# Patient Record
Sex: FEMALE | Race: WHITE | NOT HISPANIC OR LATINO | Employment: FULL TIME | URBAN - METROPOLITAN AREA
[De-identification: names, ages, dates, MRNs, and addresses within clinical notes are randomized per-mention and may not be internally consistent; named-entity substitution may affect disease eponyms.]

---

## 2017-01-10 ENCOUNTER — RADIANT APPOINTMENT (OUTPATIENT)
Dept: GENERAL RADIOLOGY | Facility: CLINIC | Age: 47
End: 2017-01-10
Attending: PODIATRIST
Payer: COMMERCIAL

## 2017-01-10 ENCOUNTER — OFFICE VISIT (OUTPATIENT)
Dept: PODIATRY | Facility: CLINIC | Age: 47
End: 2017-01-10
Payer: COMMERCIAL

## 2017-01-10 VITALS
WEIGHT: 188 LBS | BODY MASS INDEX: 33.31 KG/M2 | HEIGHT: 63 IN | DIASTOLIC BLOOD PRESSURE: 62 MMHG | SYSTOLIC BLOOD PRESSURE: 106 MMHG

## 2017-01-10 DIAGNOSIS — M25.572 SINUS TARSI SYNDROME OF LEFT FOOT: ICD-10-CM

## 2017-01-10 DIAGNOSIS — Q66.229 CONGENITAL METATARSUS ADDUCTUS: ICD-10-CM

## 2017-01-10 DIAGNOSIS — M79.672 LEFT FOOT PAIN: ICD-10-CM

## 2017-01-10 DIAGNOSIS — M19.072 PRIMARY LOCALIZED OSTEOARTHROSIS, ANKLE AND FOOT, LEFT: ICD-10-CM

## 2017-01-10 DIAGNOSIS — M76.72 PERONEAL TENDONITIS OF LEFT LOWER EXTREMITY: ICD-10-CM

## 2017-01-10 DIAGNOSIS — M79.672 LEFT FOOT PAIN: Primary | ICD-10-CM

## 2017-01-10 PROCEDURE — 73630 X-RAY EXAM OF FOOT: CPT | Mod: LT

## 2017-01-10 PROCEDURE — 99203 OFFICE O/P NEW LOW 30 MIN: CPT | Performed by: PODIATRIST

## 2017-01-10 RX ORDER — IBUPROFEN 600 MG/1
600 TABLET, FILM COATED ORAL
COMMUNITY
Start: 2014-09-18 | End: 2022-09-07

## 2017-01-10 RX ORDER — DEXAMETHASONE SODIUM PHOSPHATE 4 MG/ML
4 INJECTION, SOLUTION INTRA-ARTICULAR; INTRALESIONAL; INTRAMUSCULAR; INTRAVENOUS; SOFT TISSUE ONCE
Qty: 30 ML | Refills: 0 | Status: SHIPPED | OUTPATIENT
Start: 2017-01-10 | End: 2022-09-07

## 2017-01-10 NOTE — PATIENT INSTRUCTIONS
Call in 1 month if not better to order MRI.            [facility] Schedule     [facility]  [address] [address]  Ph: [phone]  Fax: [phone] [facility]  [address]   [place] [address]  Ph: [phone]  Fax: [phone] [facility]  [address] [place] [address]  Ph: [phone]  Fax: [phone]   Monday PM &  PM   Surgery Scheduling Line:  [phone] [facility]  [address]  [place] [address]  Ph: [phone] [facility]  [address]  [place] [address]  Ph: [phone]  Fax: [phone]   Appointment [phone] General After Hours:  [phone] Patient [phone]         Osteoarthritis of the Foot and Ankle  What Is Osteoarthritis?  Osteoarthritis is a condition characterized by the breakdown and eventual loss of cartilage in one or more joints. Cartilage (the connective tissue found at the end of the bones in the joints) protects and cushions the bones during movement. When cartilage deteriorates or is lost, symptoms develop that can restrict one s ability to easily perform daily activities.  Osteoarthritis is also known as degenerative arthritis, reflecting its nature to develop as part of the aging process. As the most common form of arthritis, osteoarthritis affects millions of Americans. Some people refer to osteoarthritis simply as arthritis, even though there are many different types of arthritis.  Osteoarthritis appears at various joints throughout the body, including the hands, feet, spine, hips, and knees. In the foot, the disease most frequently occurs in the big toe, although it is also often found in the midfoot and ankle.  Causes  Osteoarthritis is considered a  wear and tear  disease because the cartilage in the joint wears down with repeated stress and  use over time. As the cartilage deteriorates and gets thinner, the bones lose their protective covering and eventually may rub together, causing pain and inflammation of the joint.  An injury may also lead to osteoarthritis, although it may take months or years after the injury for the condition to develop. For example, osteoarthritis in the big toe is often caused by kicking or jamming the toe, or by dropping something on the toe. Osteoarthritis in the midfoot is often caused by dropping something on it, or by a sprain or fracture. In the ankle, osteoarthritis is usually caused by a fracture and occasionally by a severe sprain.  Sometimes osteoarthritis develops as a result of abnormal foot mechanics such as flat feet or high arches. A flat foot causes less stability in the ligaments (bands of tissue that connect bones), resulting in excessive strain on the joints, which can cause arthritis. A high arch is rigid and lacks mobility, causing a jamming of joints that creates an increased risk of arthritis.  Symptoms  People with osteoarthritis in the foot or ankle experience, in varying degrees, one or more of the following: Pain and stiffness in the joint, swelling in or near the joint, or difficulty walking or bending the joint.   Some patients with osteoarthritis also develop a bone spur (a bony protrusion) at the affected joint. Shoe pressure may cause pain at the site of a bone spur, and in some cases blisters or calluses may form over its surface. Bone spurs can also limit the movement of the joint.    Diagnosis  In diagnosing osteoarthritis, the foot and ankle surgeon will examine the foot thoroughly, looking for swelling in the joint, limited mobility, and pain with movement. In some cases, deformity and/or enlargement (spur) of the joint may be noted. X-rays may be ordered to evaluate the extent of the disease.  Non-surgical Treatment  To help relieve symptoms, the surgeon may begin treating osteoarthritis  with one or more of the following non-surgical approaches:  Oral medications. Nonsteroidal anti-inflammatory drugs (NSAIDs), such as ibuprofen, are often helpful in reducing the inflammation and pain. Occasionally a prescription for a steroid medication is needed to adequately reduce symptoms.   Orthotic devices. Custom orthotic devices (shoe inserts) are often prescribed to provide support to improve the foot s mechanics or cushioning to help minimize pain.   Bracing. Bracing, which restricts motion and supports the joint, can reduce pain during walking and help prevent further deformity.   Immobilization. Protecting the foot from movement by wearing a cast or removable cast-boot may be necessary to allow the inflammation to resolve.   Steroid injections. In some cases, steroid injections are applied to the affected joint to deliver anti-inflammatory medication.   Physical therapy. Exercises to strengthen the muscles, especially when the osteoarthritis occurs in the ankle, may give the patient greater stability and help avoid injury that might worsen the condition.   When Is Surgery Needed?  When osteoarthritis has progressed substantially or failed to improve with non-surgical treatment, surgery may be recommended. In advanced cases, surgery may be the only option. The goal of surgery is to decrease pain and improve function. The foot and ankle surgeon will consider a number of factors when selecting the procedure best suited to the patient s condition and lifestyle.    What is the Sinus Tarsi Syndrome?  Definition:  Clinical disorder characterized by specific symptoms and signs localized to the sinus tarsi (known as the  eye of the foot ), which refers to an opening on the outside of the foot between the ankle and heel bone.   History:  First described by Tahir Solorio in 1957. He also described a surgical procedure to address this problem (called the DEMAR Solorio procedure) that involves removal of all or a portion  of the contents of the sinus tarsi.  Etiology:  Cause can be due to an inversion (rolling out) ankle sprain (70-80% of the time) or can be due to a  pinching  or impingement of the soft tissues in the sinus tarsi due to a very pronated (rolling in) foot (20-30% of the time).  Clinical Presentation:  Patients present with localize pain to the sinus tarsi region with a feeling of instability and aggravation by weight bearing activity. These patients do poorly on uneven surfaces, i.e., grass and gravel. Physical examination reveals pain to palpation of the sinus tarsi with aggravation on foot inversion (turning in) or eversion (turning out). Looseness and instability of the ankle and foot joints may be present as well.  Diagnostic Testing:   May include x-rays, bone scan, CT scan and MRI evaluation. Injection with local anesthetic is diagnostic for localizing this problem to the sinus tarsi. Many times this is a diagnosis make by excluding other common problems in the foot as definitive diagnostic findings are rarely present. MRI is probably the one best test to shoe changes in the tissues of the sinus tarsi involving either inflammation or scar tissue from previous injury. Ankle arthroscopy may also be beneficial to directly evaluate the sinus for damaged tissue.  Treatment:  After a diagnosis is established conservative treatment can be initiated which is generally very effective in eliminating the problem. Treatment may include anti-inflammatories, stable shoes, period of immobilization, ankle sleeve and over-the-counter orthoses. Resistant cases may require a course of oral steroids, series of steroid injectionss, physical therapy or custom orthoses. Rarely is surgery indicated and if needed open surgery (through an incision) or closed surgery (via arthroscopy) can be considered. Excellent results should be expected but surgery is not a panacea and should be considered as a last resort.  Summary:  STS is a problem  that can occur commonly after an ankle sprain or in someone who has a severly pronated foot. Diagnosis is critical as this will dictate appropriate treatment which can differ significantly from other common problems seen in the foot and ankle. Conservative treatment is usually effective and surgery is rarely needed and should be considered after an adequate and thorough trial of conservative treatment.    EXERCISES   RANGE OF MOTION (ROM) AND STRETCHING EXERCISES - Subtalar Dislocation, Phase II   After your physician, physical therapist or  feels your ankle has made progress significant enough to begin more advanced exercises, he or she may recommend some of the exercises that follow. He or she may also advise you to continue with the exercises which you completed in Phase I of your rehabilitation. While completing these exercises, remember:   Restoring tissue flexibility helps normal motion to return to the joints. This allows healthier, less painful movement and activity.   An effective stretch should be held for at least 30 seconds.   A stretch should never be painful. You should only feel a gentle lengthening or release in the stretched tissue.       RANGE OF MOTION- Ankle Plantar Flexion   Sit with your right / left leg crossed over your opposite knee.   Use your opposite hand to pull the top of your foot and toes toward you.   You should feel a gentle stretch on the top of your foot/ankle. Hold this position for 10 seconds. Repeat 10-12 times    RANGE OF MOTION - Ankle Eversion   Sit with your right / left ankle crossed over your opposite knee.    your foot with your opposite hand, placing your thumb on the top of your foot and your fingers across the bottom of your foot.   Gently push your foot downward with a slight rotation so your littlest toes rise slightly.   You should feel a gentle stretch on the inside of your ankle. Hold the stretch for 10 seconds.   Repeat 10-12 times    RANGE  OF MOTION - Ankle Inversion   Sit with your right / left ankle crossed over your opposite knee.    your foot with your opposite hand, placing your thumb on the bottom of your foot and your fingers across the top of your foot.   Gently pull your foot so the smallest toe comes toward you and your thumb pushes the inside of the ball of your foot away from you.   You should feel a gentle stretch on the outside of your ankle. Hold the stretch for 10 seconds.   Repeat 10-12 times      RANGE OF MOTION - Ankle Dorsiflexion, Active Assisted   Remove shoes and sit on a chair that is preferably not on a carpeted surface.   Place right / left foot under knee. Extend your opposite leg for support.   Keeping your heel down, slide your right / left foot back toward the chair until you feel a stretch at your ankle or calf. If you do not feel a stretch, slide your bottom forward to the edge of the chair, while still keeping your heel down.   Hold this stretch for 10 seconds.   Repeat 10-12 times.       STRETCH - Gastrocsoleus, Standing   Note: This exercise can place a lot of stress on your foot and ankle. Please complete this exercise only if specifically instructed by your caregiver.   Place the ball of your right / left foot on a step, keeping your other foot firmly on the same step.   Hold on to the wall or a rail for balance.   Slowly lift your other foot, allowing your body weight to press your heel down over the edge of the step.   You should feel a stretch in your right / left calf.   Hold this position for 10 seconds.   Repeat this exercise with a slight bend in your right / left knee.   Repeat 10-12 times   STRENGTHENING EXERCISES - Subtalar Dislocation Phase II   These are some of the exercises you may progress to in your rehabilitation program. Do not begin these until you have your clinician's permission. Although your condition has improved, the Phase I exercises will continue to be helpful and you may continue to  complete them. As you complete strengthening exercises, remember:   Strong muscles with good endurance tolerate stress better.   Do the exercises as initially prescribed by your caregiver. Progress slowly with each exercise, gradually increasing the number of repetitions and weight used under his or her guidance.   You may experience muscle soreness or fatigue, but the pain or discomfort you are trying to eliminate should never worsen during these exercises. If this pain does worsen, stop and make certain you are following the directions exactly. If the pain is still present after adjustments, discontinue the exercise until you can discuss the trouble with your clinician.   You may experience muscle soreness or fatigue, but the pain or discomfort you are trying to eliminate should never worsen during these exercises. If this pain does worsen, stop and make certain you are following the directions exactly. If the pain is still present after adjustments, discontinue the exercise until you can discuss the trouble with your clinician.     STRENGTH - Plantar-flexors, Standing   Stand with your feet, shoulder width apart. Steady yourself with a wall or table using as little support as needed.   Keeping your weight evenly spread over the width of your feet, rise up on your toes.*   Hold this position for 10 seconds.   Repeat 10-12 times  *If this is too easy, shift your weight toward your right / left leg until you feel challenged. Ultimately, you may be asked to do this exercise with you right / left foot only.     STRENGTH - Plantar-flexors, Eccentric   Note: This exercise can place a lot of stress on your foot and ankle. Please complete this exercise only if specifically instructed by your caregiver.   Place the balls of your feet on a step. With your hands, use only enough support from a wall or rail to keep your balance.   Keep your knees straight and rise up on your toes.   Slowly shift your weight entirely to your  right / left toes and  your opposite foot. Gently and with controlled movement, lower your weight through your right / left foot so that your heel drops below the level of the step. You will feel a slight stretch in the back of your right / left calf at the ending position.   Use the healthy leg to help rise up onto the balls of both feet, then lower weight only on the right / left leg again. Build up to 15 repetitions. Then progress to 3 consecutive sets of 15 repetitions.*   After completing the above exercise, complete the same exercise with a slight knee bend (about 30 degrees). Again, build up to 15 repetitions. Then progress to 3 consecutive sets of 15 repetitions.*   Perform this exercise 3 times per day.   *When you easily complete 3 sets of 15, your physician, physical therapist or  may advise you to add resistance by wearing a backpack filled with additional weight.  TENDONITIS   Tendons are the strong fibrous portions ofmuscles that attach to bones and allow the muscle to move a joint when it contracts. Tendons are very strong because they have a lot of force exerted on them. Sometimes tendons can become painful because they have suffered an acute injury, in which too much force was exerted at one time, or an overuse injury, in which a normal force was exerted too frequently or over a prolonged period of time. As a result, there is damage to the tendon and its surrounding soft tissue structures and they become inflammed. Because tendons do not have a great blood supply, they do not heal rapidly and the inflammation can become chronic.   Conservative treatment for tendinitis involves rest and anti-inflammatory measures. Ice is applied 15 minutes 2-3 times daily. Anti-inflammatory medications called NSAIDs (ibuprofen, example) can be taken provided they are used with caution, as they can lead to internal bleeding and increase the risk ofstroke and heart attack. Sometimes topical  nitroglycerin is prescribed to help with pain. Often your doctor will use a special shoe or removable walking cast to immobilize the tendon, allowing it to heal without further damage from use. These devices are very useful in helping tendons heal, but they may slow you down or make you feel like your hip, knee, or back are out ofalignment. This is temporary and should go away once you are out ofthe immobilization. You should not use a walking cast when showering or driving. Another option is Platelet Rich Plasma injections. (Normally done with a Sports and Orthorapedic doctor.   If conservative measures fail, your physician may need to surgically repair the tendon by removing any chronic inflammatory tissue and sewing it back together. Sometimes it is sewn to an adjacent tendon with similar function for support and sometimes it is lengthened. . Sometimes the bones around the tendon need to be realigned or reshaped to better support the tendon or prevent further damage. Your foot and ankle surgeon will discuss the specifics of your surgery with you, should you need it.    Towel stretch: Sit on a hard surface with your injured leg stretched out in front of you. Loop a towel around your toes and the ball of your foot and pull the towel toward your body keeping your leg straight. Hold this position for 15 to 30 seconds and then relax. Repeat 3 times. Then push the towel away with the ball of your foot. Repeat 3 times.  When you don't feel much of a stretch using the towel, you can start the standing calf stretch and the following exercises.  Standing calf stretch: Stand facing a wall with your hands on the wall at about eye level. Keep your injured leg back with your heel on the floor. Keep the other leg forward with the knee bent. Turn your back foot slightly inward (as if you were pigeon-toed). Slowly lean into the wall until you feel a stretch in the back of your calf. Hold the stretch for 15 to 30 seconds. Return  to the starting position. Repeat 3 times. Do this exercise several times each day.   Standing soleus stretch: Stand facing a wall with your hands on the wall at about chest height. Keep your injured leg back with your heel on the floor. Keep the other leg forward with the knee bent. Turn your back foot slightly inward (as if you were pigeon-toed). Bend your back knee slightly and gently lean into the wall until you feel a stretch in the lower calf of your injured leg. Hold the stretch for 15 to 30 seconds. Return to the starting position. Repeat 3 times.   Achilles stretch: Stand with the ball of one foot on a stair. Reach for the step below with your heel until you feel a stretch in the arch of your foot. Hold this position for 15 to 30 seconds and then relax. Repeat 3 times.   Heel raise: Balance yourself while standing behind a chair or counter. Using the chair or counter as a support to help you, raise your body up onto your toes and hold for 5 seconds. Then slowly lower yourself down without holding onto the support. (It's OK to keep holding onto the support if you need to.) When this exercise becomes less painful, try lowering yourself down on the injured leg only. Repeat 15 times. Do 2 sets of 15. Rest 30 seconds between sets.   Step-up: Stand with the foot of your injured leg on a support 3 to 5 inches high (like a small step or block of wood). Keep your other foot flat on the floor. Shift your weight onto the injured leg on the support. Straighten your injured leg as the other leg comes off the floor. Return to the starting position by bending your injured leg and slowly lowering your uninjured leg back to the floor. Do 2 sets of 15.   Resisted ankle eversion: Sit with both legs stretched out in front of you, with your feet about a shoulder's width apart. Tie a loop in one end of elastic tubing. Put the foot of your injured leg through the loop so that the tubing goes around the arch of that foot and wraps  around the outside of the other foot. Hold onto the other end of the tubing with your hand to provide tension. Turn the foot of your injured leg up and out. Make sure you keep your other foot still so that it will allow the tubing to stretch as you move the foot of your injured leg. Return to the starting position. Do 2 sets of 15.   Balance and reach exercises: Stand next to a chair with your injured leg farther from the chair. The chair will provide support if you need it. Stand on the foot of your injured leg and bend your knee slightly. Try to raise the arch of this foot while keeping your big toe on the floor.   Keep your foot in this position. With the hand that is farther away from the chair, reach forward in front of you by bending at the waist. Avoid bending your knee any more as you do this. Repeat this 10 times. To make the exercise more challenging, reach farther in front of you. Do 2 sets of 10.    the same position as above. While keeping your arch height, reach the hand that is farther away from the chair across your body toward the chair. The farther you reach, the more challenging the exercise. Do 2 sets of 10.    Resisted ankle eversion: Sit with both legs stretched out in front of you, with your feet about a shoulder's width apart. Tie a loop in one end of elastic tubing. Put the foot of your injured leg through the loop so that the tubing goes around the arch of that foot and wraps around the outside of the other foot. Hold onto the other end of the tubing with your hand to provide tension. Turn the foot of your injured leg up and out. Make sure you keep your other foot still so that it will allow the tubing to stretch as you move the foot of your injured leg. Return to the starting position. Do 2 sets of 15.   If you have access to a wobble board, do the following exercises:  Wobble board exercises:   Stand on a wobble board with your feet shoulder width apart. Rock the board forwards  and backwards 30 times, then side to side 30 times. Hold on to a chair if you need support.   Rotate the wobble board around so that the edge of the board is in contact with the floor at all times. Do this 30 times in a clockwise and then a counterclockwise direction.   Balance on the wobble board for as long as you can without letting the edges touch the floor. Try to do this for 2 minutes without touching the floor.   Rotate the wobble board in clockwise and counterclockwise circles, but do not let the edge of the board touch the floor.   When you have mastered exercises A through D, try repeating them while standing on just your injured leg.   After you are able to do these exercises on one leg, try to do them with your eyes closed. Make sure you have something nearby to support you in case you lose your balance.

## 2017-01-10 NOTE — NURSING NOTE
"Chief Complaint   Patient presents with     Foot Problems     pain on left foot x3.5months feels pain in the midle of her foot when squeezed in the middle of the foot        Initial /62 mmHg  Ht 5' 3\" (1.6 m)  Wt 188 lb (85.276 kg)  BMI 33.31 kg/m2 Estimated body mass index is 33.31 kg/(m^2) as calculated from the following:    Height as of this encounter: 5' 3\" (1.6 m).    Weight as of this encounter: 188 lb (85.276 kg).  BP completed using cuff size: regular  Raphael Ho MA      "

## 2017-01-10 NOTE — MR AVS SNAPSHOT
After Visit Summary   1/10/2017    Fadia Geronimo    MRN: [id number]           Patient Information     Date Of Birth          1970        Visit Information        Provider Department      1/10/2017 3:45 PM Raissa Hernandez DPM, [facility]        Today's Diagnoses     Left foot pain    -  1     Primary localized osteoarthrosis, ankle and foot, left         Sinus tarsi syndrome of left foot         Peroneal tendonitis of left lower extremity         Congenital metatarsus adductus           Care Instructions    Call in 1 month if not better to order MRI.            Dr. Hernandez's Clinic Schedule     [facility]  [address] [place] [address]  Ph: [phone]  Fax: [phone] [facility]  [address] [place] [address]  Ph: [phone]  Fax: [phone] [facility]  [address] [place] [address]  Ph: [phone]  Fax: [phone]   Monday PM &  AM Friday PM   Surgery Scheduling Line:  [phone] [facility]  [address]  [place] [address]  Ph: [phone] [facility]  [address]  [place] [address]  Ph: [phone]  Fax: [phone]   Appointment [phone] General After Hours:  [phone] Patient [phone]         Osteoarthritis of the Foot and Ankle  What Is Osteoarthritis?  Osteoarthritis is a condition characterized by the breakdown and eventual loss of cartilage in one or more joints. Cartilage (the connective tissue found at the end of the bones in the joints) protects and cushions the bones during movement. When cartilage deteriorates or is lost, symptoms develop that can restrict one s ability to easily perform daily activities.  Osteoarthritis is also known as degenerative arthritis, reflecting its nature to develop as part of the aging process. As the most  common form of arthritis, osteoarthritis affects millions of Americans. Some people refer to osteoarthritis simply as arthritis, even though there are many different types of arthritis.  Osteoarthritis appears at various joints throughout the body, including the hands, feet, spine, hips, and knees. In the foot, the disease most frequently occurs in the big toe, although it is also often found in the midfoot and ankle.  Causes  Osteoarthritis is considered a  wear and tear  disease because the cartilage in the joint wears down with repeated stress and use over time. As the cartilage deteriorates and gets thinner, the bones lose their protective covering and eventually may rub together, causing pain and inflammation of the joint.  An injury may also lead to osteoarthritis, although it may take months or years after the injury for the condition to develop. For example, osteoarthritis in the big toe is often caused by kicking or jamming the toe, or by dropping something on the toe. Osteoarthritis in the midfoot is often caused by dropping something on it, or by a sprain or fracture. In the ankle, osteoarthritis is usually caused by a fracture and occasionally by a severe sprain.  Sometimes osteoarthritis develops as a result of abnormal foot mechanics such as flat feet or high arches. A flat foot causes less stability in the ligaments (bands of tissue that connect bones), resulting in excessive strain on the joints, which can cause arthritis. A high arch is rigid and lacks mobility, causing a jamming of joints that creates an increased risk of arthritis.  Symptoms  People with osteoarthritis in the foot or ankle experience, in varying degrees, one or more of the following: Pain and stiffness in the joint, swelling in or near the joint, or difficulty walking or bending the joint.   Some patients with osteoarthritis also develop a bone spur (a bony protrusion) at the affected joint. Shoe pressure may cause pain at the  site of a bone spur, and in some cases blisters or calluses may form over its surface. Bone spurs can also limit the movement of the joint.    Diagnosis  In diagnosing osteoarthritis, the foot and ankle surgeon will examine the foot thoroughly, looking for swelling in the joint, limited mobility, and pain with movement. In some cases, deformity and/or enlargement (spur) of the joint may be noted. X-rays may be ordered to evaluate the extent of the disease.  Non-surgical Treatment  To help relieve symptoms, the surgeon may begin treating osteoarthritis with one or more of the following non-surgical approaches:  Oral medications. Nonsteroidal anti-inflammatory drugs (NSAIDs), such as ibuprofen, are often helpful in reducing the inflammation and pain. Occasionally a prescription for a steroid medication is needed to adequately reduce symptoms.   Orthotic devices. Custom orthotic devices (shoe inserts) are often prescribed to provide support to improve the foot s mechanics or cushioning to help minimize pain.   Bracing. Bracing, which restricts motion and supports the joint, can reduce pain during walking and help prevent further deformity.   Immobilization. Protecting the foot from movement by wearing a cast or removable cast-boot may be necessary to allow the inflammation to resolve.   Steroid injections. In some cases, steroid injections are applied to the affected joint to deliver anti-inflammatory medication.   Physical therapy. Exercises to strengthen the muscles, especially when the osteoarthritis occurs in the ankle, may give the patient greater stability and help avoid injury that might worsen the condition.   When Is Surgery Needed?  When osteoarthritis has progressed substantially or failed to improve with non-surgical treatment, surgery may be recommended. In advanced cases, surgery may be the only option. The goal of surgery is to decrease pain and improve function. The foot and ankle surgeon will consider  a number of factors when selecting the procedure best suited to the patient s condition and lifestyle.    What is the Sinus Tarsi Syndrome?  Definition:  Clinical disorder characterized by specific symptoms and signs localized to the sinus tarsi (known as the  eye of the foot ), which refers to an opening on the outside of the foot between the ankle and heel bone.   History:  First described by Tahir Solorio in 1957. He also described a surgical procedure to address this problem (called the DEMAR Solorio procedure) that involves removal of all or a portion of the contents of the sinus tarsi.  Etiology:  Cause can be due to an inversion (rolling out) ankle sprain (70-80% of the time) or can be due to a  pinching  or impingement of the soft tissues in the sinus tarsi due to a very pronated (rolling in) foot (20-30% of the time).  Clinical Presentation:  Patients present with localize pain to the sinus tarsi region with a feeling of instability and aggravation by weight bearing activity. These patients do poorly on uneven surfaces, i.e., grass and gravel. Physical examination reveals pain to palpation of the sinus tarsi with aggravation on foot inversion (turning in) or eversion (turning out). Looseness and instability of the ankle and foot joints may be present as well.  Diagnostic Testing:   May include x-rays, bone scan, CT scan and MRI evaluation. Injection with local anesthetic is diagnostic for localizing this problem to the sinus tarsi. Many times this is a diagnosis make by excluding other common problems in the foot as definitive diagnostic findings are rarely present. MRI is probably the one best test to shoe changes in the tissues of the sinus tarsi involving either inflammation or scar tissue from previous injury. Ankle arthroscopy may also be beneficial to directly evaluate the sinus for damaged tissue.  Treatment:  After a diagnosis is established conservative treatment can be initiated which is generally  very effective in eliminating the problem. Treatment may include anti-inflammatories, stable shoes, period of immobilization, ankle sleeve and over-the-counter orthoses. Resistant cases may require a course of oral steroids, series of steroid injectionss, physical therapy or custom orthoses. Rarely is surgery indicated and if needed open surgery (through an incision) or closed surgery (via arthroscopy) can be considered. Excellent results should be expected but surgery is not a panacea and should be considered as a last resort.  Summary:  STS is a problem that can occur commonly after an ankle sprain or in someone who has a severly pronated foot. Diagnosis is critical as this will dictate appropriate treatment which can differ significantly from other common problems seen in the foot and ankle. Conservative treatment is usually effective and surgery is rarely needed and should be considered after an adequate and thorough trial of conservative treatment.    EXERCISES   RANGE OF MOTION (ROM) AND STRETCHING EXERCISES - Subtalar Dislocation, Phase II   After your physician, physical therapist or  feels your ankle has made progress significant enough to begin more advanced exercises, he or she may recommend some of the exercises that follow. He or she may also advise you to continue with the exercises which you completed in Phase I of your rehabilitation. While completing these exercises, remember:   Restoring tissue flexibility helps normal motion to return to the joints. This allows healthier, less painful movement and activity.   An effective stretch should be held for at least 30 seconds.   A stretch should never be painful. You should only feel a gentle lengthening or release in the stretched tissue.       RANGE OF MOTION- Ankle Plantar Flexion   Sit with your right / left leg crossed over your opposite knee.   Use your opposite hand to pull the top of your foot and toes toward you.   You should feel  a gentle stretch on the top of your foot/ankle. Hold this position for 10 seconds. Repeat 10-12 times    RANGE OF MOTION - Ankle Eversion   Sit with your right / left ankle crossed over your opposite knee.    your foot with your opposite hand, placing your thumb on the top of your foot and your fingers across the bottom of your foot.   Gently push your foot downward with a slight rotation so your littlest toes rise slightly.   You should feel a gentle stretch on the inside of your ankle. Hold the stretch for 10 seconds.   Repeat 10-12 times    RANGE OF MOTION - Ankle Inversion   Sit with your right / left ankle crossed over your opposite knee.    your foot with your opposite hand, placing your thumb on the bottom of your foot and your fingers across the top of your foot.   Gently pull your foot so the smallest toe comes toward you and your thumb pushes the inside of the ball of your foot away from you.   You should feel a gentle stretch on the outside of your ankle. Hold the stretch for 10 seconds.   Repeat 10-12 times      RANGE OF MOTION - Ankle Dorsiflexion, Active Assisted   Remove shoes and sit on a chair that is preferably not on a carpeted surface.   Place right / left foot under knee. Extend your opposite leg for support.   Keeping your heel down, slide your right / left foot back toward the chair until you feel a stretch at your ankle or calf. If you do not feel a stretch, slide your bottom forward to the edge of the chair, while still keeping your heel down.   Hold this stretch for 10 seconds.   Repeat 10-12 times.       STRETCH - Gastrocsoleus, Standing   Note: This exercise can place a lot of stress on your foot and ankle. Please complete this exercise only if specifically instructed by your caregiver.   Place the ball of your right / left foot on a step, keeping your other foot firmly on the same step.   Hold on to the wall or a rail for balance.   Slowly lift your other foot, allowing your body  weight to press your heel down over the edge of the step.   You should feel a stretch in your right / left calf.   Hold this position for 10 seconds.   Repeat this exercise with a slight bend in your right / left knee.   Repeat 10-12 times   STRENGTHENING EXERCISES - Subtalar Dislocation Phase II   These are some of the exercises you may progress to in your rehabilitation program. Do not begin these until you have your clinician's permission. Although your condition has improved, the Phase I exercises will continue to be helpful and you may continue to complete them. As you complete strengthening exercises, remember:   Strong muscles with good endurance tolerate stress better.   Do the exercises as initially prescribed by your caregiver. Progress slowly with each exercise, gradually increasing the number of repetitions and weight used under his or her guidance.   You may experience muscle soreness or fatigue, but the pain or discomfort you are trying to eliminate should never worsen during these exercises. If this pain does worsen, stop and make certain you are following the directions exactly. If the pain is still present after adjustments, discontinue the exercise until you can discuss the trouble with your clinician.   You may experience muscle soreness or fatigue, but the pain or discomfort you are trying to eliminate should never worsen during these exercises. If this pain does worsen, stop and make certain you are following the directions exactly. If the pain is still present after adjustments, discontinue the exercise until you can discuss the trouble with your clinician.     STRENGTH - Plantar-flexors, Standing   Stand with your feet, shoulder width apart. Steady yourself with a wall or table using as little support as needed.   Keeping your weight evenly spread over the width of your feet, rise up on your toes.*   Hold this position for 10 seconds.   Repeat 10-12 times  *If this is too easy, shift your  weight toward your right / left leg until you feel challenged. Ultimately, you may be asked to do this exercise with you right / left foot only.     STRENGTH - Plantar-flexors, Eccentric   Note: This exercise can place a lot of stress on your foot and ankle. Please complete this exercise only if specifically instructed by your caregiver.   Place the balls of your feet on a step. With your hands, use only enough support from a wall or rail to keep your balance.   Keep your knees straight and rise up on your toes.   Slowly shift your weight entirely to your right / left toes and  your opposite foot. Gently and with controlled movement, lower your weight through your right / left foot so that your heel drops below the level of the step. You will feel a slight stretch in the back of your right / left calf at the ending position.   Use the healthy leg to help rise up onto the balls of both feet, then lower weight only on the right / left leg again. Build up to 15 repetitions. Then progress to 3 consecutive sets of 15 repetitions.*   After completing the above exercise, complete the same exercise with a slight knee bend (about 30 degrees). Again, build up to 15 repetitions. Then progress to 3 consecutive sets of 15 repetitions.*   Perform this exercise 3 times per day.   *When you easily complete 3 sets of 15, your physician, physical therapist or  may advise you to add resistance by wearing a backpack filled with additional weight.  TENDONITIS   Tendons are the strong fibrous portions ofmuscles that attach to bones and allow the muscle to move a joint when it contracts. Tendons are very strong because they have a lot of force exerted on them. Sometimes tendons can become painful because they have suffered an acute injury, in which too much force was exerted at one time, or an overuse injury, in which a normal force was exerted too frequently or over a prolonged period of time. As a result, there is  damage to the tendon and its surrounding soft tissue structures and they become inflammed. Because tendons do not have a great blood supply, they do not heal rapidly and the inflammation can become chronic.   Conservative treatment for tendinitis involves rest and anti-inflammatory measures. Ice is applied 15 minutes 2-3 times daily. Anti-inflammatory medications called NSAIDs (ibuprofen, example) can be taken provided they are used with caution, as they can lead to internal bleeding and increase the risk ofstroke and heart attack. Sometimes topical nitroglycerin is prescribed to help with pain. Often your doctor will use a special shoe or removable walking cast to immobilize the tendon, allowing it to heal without further damage from use. These devices are very useful in helping tendons heal, but they may slow you down or make you feel like your hip, knee, or back are out ofalignment. This is temporary and should go away once you are out ofthe immobilization. You should not use a walking cast when showering or driving. Another option is Platelet Rich Plasma injections. (Normally done with a Sports and Orthorapedic doctor.   If conservative measures fail, your physician may need to surgically repair the tendon by removing any chronic inflammatory tissue and sewing it back together. Sometimes it is sewn to an adjacent tendon with similar function for support and sometimes it is lengthened. . Sometimes the bones around the tendon need to be realigned or reshaped to better support the tendon or prevent further damage. Your foot and ankle surgeon will discuss the specifics of your surgery with you, should you need it.    Towel stretch: Sit on a hard surface with your injured leg stretched out in front of you. Loop a towel around your toes and the ball of your foot and pull the towel toward your body keeping your leg straight. Hold this position for 15 to 30 seconds and then relax. Repeat 3 times. Then push the towel  away with the ball of your foot. Repeat 3 times.  When you don't feel much of a stretch using the towel, you can start the standing calf stretch and the following exercises.  Standing calf stretch: Stand facing a wall with your hands on the wall at about eye level. Keep your injured leg back with your heel on the floor. Keep the other leg forward with the knee bent. Turn your back foot slightly inward (as if you were pigeon-toed). Slowly lean into the wall until you feel a stretch in the back of your calf. Hold the stretch for 15 to 30 seconds. Return to the starting position. Repeat 3 times. Do this exercise several times each day.   Standing soleus stretch: Stand facing a wall with your hands on the wall at about chest height. Keep your injured leg back with your heel on the floor. Keep the other leg forward with the knee bent. Turn your back foot slightly inward (as if you were pigeon-toed). Bend your back knee slightly and gently lean into the wall until you feel a stretch in the lower calf of your injured leg. Hold the stretch for 15 to 30 seconds. Return to the starting position. Repeat 3 times.   Achilles stretch: Stand with the ball of one foot on a stair. Reach for the step below with your heel until you feel a stretch in the arch of your foot. Hold this position for 15 to 30 seconds and then relax. Repeat 3 times.   Heel raise: Balance yourself while standing behind a chair or counter. Using the chair or counter as a support to help you, raise your body up onto your toes and hold for 5 seconds. Then slowly lower yourself down without holding onto the support. (It's OK to keep holding onto the support if you need to.) When this exercise becomes less painful, try lowering yourself down on the injured leg only. Repeat 15 times. Do 2 sets of 15. Rest 30 seconds between sets.   Step-up: Stand with the foot of your injured leg on a support 3 to 5 inches high (like a small step or block of wood). Keep your other  foot flat on the floor. Shift your weight onto the injured leg on the support. Straighten your injured leg as the other leg comes off the floor. Return to the starting position by bending your injured leg and slowly lowering your uninjured leg back to the floor. Do 2 sets of 15.   Resisted ankle eversion: Sit with both legs stretched out in front of you, with your feet about a shoulder's width apart. Tie a loop in one end of elastic tubing. Put the foot of your injured leg through the loop so that the tubing goes around the arch of that foot and wraps around the outside of the other foot. Hold onto the other end of the tubing with your hand to provide tension. Turn the foot of your injured leg up and out. Make sure you keep your other foot still so that it will allow the tubing to stretch as you move the foot of your injured leg. Return to the starting position. Do 2 sets of 15.   Balance and reach exercises: Stand next to a chair with your injured leg farther from the chair. The chair will provide support if you need it. Stand on the foot of your injured leg and bend your knee slightly. Try to raise the arch of this foot while keeping your big toe on the floor.   Keep your foot in this position. With the hand that is farther away from the chair, reach forward in front of you by bending at the waist. Avoid bending your knee any more as you do this. Repeat this 10 times. To make the exercise more challenging, reach farther in front of you. Do 2 sets of 10.    the same position as above. While keeping your arch height, reach the hand that is farther away from the chair across your body toward the chair. The farther you reach, the more challenging the exercise. Do 2 sets of 10.    Resisted ankle eversion: Sit with both legs stretched out in front of you, with your feet about a shoulder's width apart. Tie a loop in one end of elastic tubing. Put the foot of your injured leg through the loop so that the tubing  goes around the arch of that foot and wraps around the outside of the other foot. Hold onto the other end of the tubing with your hand to provide tension. Turn the foot of your injured leg up and out. Make sure you keep your other foot still so that it will allow the tubing to stretch as you move the foot of your injured leg. Return to the starting position. Do 2 sets of 15.   If you have access to a wobble board, do the following exercises:  Wobble board exercises:   Stand on a wobble board with your feet shoulder width apart. Rock the board forwards and backwards 30 times, then side to side 30 times. Hold on to a chair if you need support.   Rotate the wobble board around so that the edge of the board is in contact with the floor at all times. Do this 30 times in a clockwise and then a counterclockwise direction.   Balance on the wobble board for as long as you can without letting the edges touch the floor. Try to do this for 2 minutes without touching the floor.   Rotate the wobble board in clockwise and counterclockwise circles, but do not let the edge of the board touch the floor.   When you have mastered exercises A through D, try repeating them while standing on just your injured leg.   After you are able to do these exercises on one leg, try to do them with your eyes closed. Make sure you have something nearby to support you in case you lose your balance.          Follow-ups after your visit        Additional Services     [facility] PT, HAND, AND CHIROPRACTIC REFERRAL       **This order will print in the [facility] Scheduling Office**    Physical Therapy, Hand Therapy and Chiropractic Care are available through:    *[facility]  *[facility]  *[facility]    Call one number to schedule at any of the above locations: [phone].    Your provider has referred you to: Physical Therapy at [facility] or [facility]    Indication/Reason for Referral: left sinus tarsitis/ arthritis, and  peroneal tendonitis  Onset of Illness: few months  Therapy Orders: Evaluate and Treat  Special Programs: None  Special Request: Exercise: Home Exercise Program, Posture/Body Mechanics and Stretching/Flexibility  Modalities: As Indicated: , Iontophoresis (Please Order: Dexamethasone Sodium Phosphate - 4mg/ml injectable, 30 cc total volume) and Ultrasound    Bryon Slater      Additional Comments for the Therapist or Chiropractor:     Please be aware that coverage of these services is subject to the terms and limitations of your health insurance plan.  Call member services at your health plan with any benefit or coverage questions.      Please bring the following to your appointment:    *Your personal calendar for scheduling future appointments  *Comfortable clothing            ORTHOTICS REFERRAL       Please be aware that coverage of these services is subject to the terms and limitations of your health insurance plan.  Call member services at your health plan with any benefit or coverage questions.      Please bring the following to your appointment:    >>   Any x-rays, CTs or MRIs which have been performed.  Contact the facility where they were done to arrange for  prior to your scheduled appointment.  Any new CT, MRI or other procedures ordered by your specialist must be performed at a [facility] facility or coordinated by your clinic's referral office.    >>   List of current medications   >>   This referral request   >>   Any documents/labs given to you for this referral    ==This Referral PRINTS in the [facility] (ORTHOTICS & PROSTHETICS) Central scheduling office ==     The [facility] Central Scheduling staff will contact patient to arrange appointments. Central Scheduling Phone #:  [place], [place]  [phone]     Orthotics: Foot Orthotics with arch support                  Who to contact     If you have questions or need follow up information about today's clinic visit or your  "schedule please contact [facility] directly at [phone].  Normal or non-critical lab and imaging results will be communicated to you by MyChart, letter or phone within 4 business days after the clinic has received the results. If you do not hear from us within 7 days, please contact the clinic through MyChart or phone. If you have a critical or abnormal lab result, we will notify you by phone as soon as possible.  Submit refill requests through Canara or call your pharmacy and they will forward the refill request to us. Please allow 3 business days for your refill to be completed.          Additional Information About Your Visit        LuxanovaharMamaBear App Information     Canara lets you send messages to your doctor, view your test results, renew your prescriptions, schedule appointments and more. To sign up, go to [email or web address] . Click on \"Log in\" on the left side of the screen, which will take you to the Welcome page. Then click on \"Sign up Now\" on the right side of the page.     You will be asked to enter the access code listed below, as well as some personal information. Please follow the directions to create your username and password.     Your access code is: [email or web address]  Expires: 4/10/2017  4:22 PM     Your access code will  in 90 days. If you need help or a new code, please call your [facility] clinic or [phone].        Care EveryWhere ID     This is your Care EveryWhere ID. This could be used by other organizations to access your [facility] medical records  [id number]        Your Vitals Were     Height BMI (Body Mass Index)                5' 3\" (1.6 m) 33.31 kg/m2           Blood Pressure from Last 3 Encounters:   01/10/17 106/62    Weight from Last 3 Encounters:   01/10/17 188 lb (85.276 kg)              We Performed the Following     [facility] PT[facility] HAND, AND CHIROPRACTIC REFERRAL     ORTHOTICS REFERRAL          Today's Medication Changes          These changes are accurate as of: " 1/10/17  4:22 PM.  If you have any questions, ask your nurse or doctor.               Start taking these medicines.        Dose/Directions    dexamethasone 4 MG/ML injection   Commonly known as:  DECADRON   Used for:  Left foot pain, Primary localized osteoarthrosis, ankle and foot, left, Sinus tarsi syndrome of left foot, Peroneal tendonitis of left lower extremity, Congenital metatarsus adductus   Started by:  Raissa Hernandez DPM, Pod        Dose:  4 mg   Apply 1 mL (4 mg) topically once for 1 dose For physical therapy, iontophoresis   Quantity:  30 mL   Refills:  0       order for DME   Used for:  Left foot pain, Primary localized osteoarthrosis, ankle and foot, left, Sinus tarsi syndrome of left foot, Peroneal tendonitis of left lower extremity, Congenital metatarsus adductus   Started by:  Raissa Hernandez DPM, Pod        Equipment being ordered: tall cast boot.   Quantity:  1 Device   Refills:  0            Where to get your medicines      These medications were sent to [facility] [address] [place] [employer] - [facility] [place] [id number] [place] [address]  [address] [facility] [address]     Phone:  [phone]    - dexamethasone 4 MG/ML injection      Some of these will need a paper prescription and others can be bought over the counter.  Ask your nurse if you have questions.     Bring a paper prescription for each of these medications    - order for DME             Primary Care Provider    None Specified       No primary provider on file.        Thank you!     Thank you for choosing [facility]  for your care. Our goal is always to provide you with excellent care. Hearing back from our patients is one way we can continue to improve our services. Please take a few minutes to complete the written survey that you may receive in the mail after your visit with us. Thank you!             Your Updated Medication List - Protect others around you: Learn how to safely use, store and throw away your medicines at  www.disposemymeds.org.          This list is accurate as of: 1/10/17  4:22 PM.  Always use your most recent med list.                   Brand Name Dispense Instructions for use    dexamethasone 4 MG/ML injection    DECADRON    30 mL    Apply 1 mL (4 mg) topically once for 1 dose For physical therapy, iontophoresis       ibuprofen 600 MG tablet    ADVIL/MOTRIN     Take 600 mg by mouth       order for DME     1 Device    Equipment being ordered: tall cast boot.

## 2017-01-10 NOTE — PROGRESS NOTES
"PATIENT HISTORY:  Fadia Geronimo is a 46 year old female who presents to clinic for pain to left foot. Describes it as a \"toothache\". Pain has been going on for about 3 1/2 months. Started after being on her feet at her job more. Did have foot stepped on a few weeks ago and pain got a little worse. Pain is everyday. Heels make it worse. Has tried icing, ibuprofen which helps minimally. Pain is 6/10.  It is shooting and a dull ache. Would like to know what is causing pain and what can be done for it.     Review of Systems:  Patient denies fever, chills, rash, wound, stiffness, numbness, weakness, heart burn, blood in stool, chest pain with activity, calf pain when walking, shortness of breath with activity, chronic cough, easy bleeding/bruising, swelling of ankles, excessive thirst, fatigue, depression, anxiety.  Patient admits to limping at time.     PAST MEDICAL HISTORY: TIA, arthritis neck, Celiac Srpue,      PAST SURGICAL HISTORY: gastroduodenojujenosity surgery in 2001.      MEDICATIONS: No current outpatient prescriptions on file.     ALLERGIES:  Penicillin,      SOCIAL HISTORY:   Social History     Social History     Marital Status:      Spouse Name: N/A     Number of Children: N/A     Years of Education: N/A     Occupational History     Not on file.     Social History Main Topics     Smoking status: Not on file     Smokeless tobacco: Not on file     Alcohol Use: Not on file     Drug Use: Not on file     Sexual Activity: Not on file     Other Topics Concern     Not on file     Social History Narrative     No narrative on file        FAMILY HISTORY: No family history on file.     EXAM:Vitals: /62 mmHg  Ht 5' 3\" (1.6 m)  Wt 188 lb (85.276 kg)  BMI 33.31 kg/m2    General appearance: Patient is alert and fully cooperative with history & exam.  No sign of distress is noted during the visit.     Psychiatric: Affect is pleasant & appropriate.  Patient appears motivated to improve health.   "   Respiratory: Breathing is regular & unlabored while sitting.     HEENT: Hearing is intact to spoken word.  Speech is clear.  No gross evidence of visual impairment that would impact ambulation.     Dermatologic: Skin is intact to both lower extremities without significant lesions, rash or abrasion.  No paronychia or evidence of soft tissue infection is noted.     Vascular: DP & PT pulses are intact & regular bilaterally.  No significant edema or varicosities noted.  CFT and skin temperature is normal to both lower extremities.     Neurologic: Lower extremity sensation is intact to light touch.  No evidence of weakness or contracture in the lower extremities.  No evidence of neuropathy.     Musculoskeletal: Patient is ambulatory without assistive device or brace.  Increase arch height. Pain on palpation of left 5th metatarsal base and sinus tarsi and 4th metatarsal base.     Radiographs:  I have looked at and reviewed xrays personally.  Arthritic changes to midfoot and subtalar joint. No fractures noted. Collapse at navicular cuneiform joint     ASSESSMENT:    Left foot pain  Primary localized osteoarthrosis, ankle and foot, left  Sinus tarsi syndrome of left foot  Peroneal tendonitis of left lower extremity  Congenital metatarsus adductus       PLAN:  Reviewed patient's chart in epic. Reviewed xrays. Talked about sinus tarsitis which is early arthritis to the sinus tarsi joint. Talked about treatments including orthotics, icing, compression, NSAIDs, injection, physical therapy with iontophoresis, immobilization, compounding pain cream, permanent ankle bracing, MRI to assess for need for fusion.    Reviewed and discussed causes of achilles tendonitis.  We discussed treatments such as immobiliation, icing, stretching, heel lifts, orthotics, physical therapy, MRI.     Talked about arthritis and treatments including orthotics, injections, icing, NSAIDS, bracing, physical therapy, compounding pain cream, or surgical  intervention.    Recommend boot and physical therapy at this time with iontophoresis. Also recommend orthotics. Was given order for this. If pain continues, recommend MRI.        Raissa Hernandez DPM, Pod    Weight management plan: Patient was referred to their PCP to discuss a diet and exercise plan.

## 2017-01-16 ENCOUNTER — THERAPY VISIT (OUTPATIENT)
Dept: PHYSICAL THERAPY | Facility: CLINIC | Age: 47
End: 2017-01-16
Payer: COMMERCIAL

## 2017-01-16 DIAGNOSIS — M79.672 LEFT FOOT PAIN: Primary | ICD-10-CM

## 2017-01-16 PROCEDURE — 97140 MANUAL THERAPY 1/> REGIONS: CPT | Mod: GP | Performed by: PHYSICAL THERAPIST

## 2017-01-16 PROCEDURE — 97162 PT EVAL MOD COMPLEX 30 MIN: CPT | Mod: GP | Performed by: PHYSICAL THERAPIST

## 2017-01-16 NOTE — PROGRESS NOTES
Subjective:    HPI                    Objective:    System    Physical Exam    General     ROS     Physical Therapy Initial Evaluation:   Jan 16, 2017  Precautions/Restrictions/MD instructions:   Per Orders:    Special Request: Exercise: Home Exercise Program, Posture/Body Mechanics and Stretching/Flexibility  Modalities: As Indicated: , Iontophoresis (Please Order: Dexamethasone Sodium Phosphate - 4mg/ml injectable, 30 cc total volume) and Ultrasound    Subjective:   Chief Complaint: Pain deep in the midfoot, possibly more lateral than medial. Pain goes down to toes and up just above the ankle. HAs numbness/tingling from Reynauds. Some stiffness.   Symptom Stability: Unpredictable, Variable day-to-day without pattern  New/Recurrent/Chronic: New  Patient's Goal(s): Just want no pain; be able to do normal things again like go for a walk; wear her cute shoes  DOI/onset: October   Referral Date: 1/10/2017  Mechanism of onset: Doesn't remember, started while working long hours (12-14 hour days)  PMH/surgical history/trauma: Osteoarthritis (hands, hips, neck and feet), overweight, TIAs during pregnancy, astham, migraines (related to neck osteoarthritis, disc degeneration, and whiplash injury), concussions (3 in lifetime, last was 2-3 years ago), Reynaud's disease, celiac sprue, GERD, malrotated bowel syndrome, lubal ligation (2001), gallbladder removal, appendix removal, duodenal jujenostomy,   Previous Treatment (Effect): Boot (helpful)  Imaging: X-Ray: IMPRESSION: No evidence of acute fracture or subluxation. Joint spaces are well-preserved.  Pain: 3/10 at present, 3/10 at best, 8/10 at worst  Quality of Pain: Dull ache that can shoot pain down the toes and up into the ankle.   Better: Stay off of it, don't move it  Worse: wearing a heeled shoe, squeezing the foot, ace bandage wrap, ice hurts, mvoe certain directions, walking on it  Progression of Symptoms since onset: got worse, then got better; currently plateaued.     Occupation: [employer] Job duties: prolonged standing, prolonged sitting, lifting/carrying, repetive tasks, computer work  Sleeping: Wakes up 1-2 times per night, can fall asleep again quickly  Other current functional challenges: walking, stairs (3 steps into the house; stairs at work)   Current Functional Status: walking - pain up to 4.5/10 ;  Stairs - has been taking the elevator; going down stairs is easier than going up stairs.   Previous Functional Status: no restrictions prior  Current HEP/exercise regimen: /work-outs (strength training and cardio)  Medications: Ibuprofen, Claritin, Albuterol, Tums  General health as reported by patient: Fair  Transportation: able to drive, has her own car  Live with Others: Lives with fiance  Red Flags: Patient denies the following: Pain with Cough / Sneeze / Laughing ; Night Pain ; Fever/Chills ; Weakness ; Saddle Anesthesia ; Change in Bowel or Bladder ; Unexplained Weight Loss ; Calf Pain/Swelling/Warmth  Patient reports the following: Numbness/Tingling ; Edema of the Feet (only when she is on her feet more) ;     Objective:    Standing Posture: Stands with decreased weightbearing through affected lower extremity.     Gait: Excessive arch pronation during gait.     AROM (PROM): (* indicates patient's pain)   ROM L ROM R   Plantarflexion 57* 74   DF, knee straight -30* 10   DF, knee bent 4* 22       Strength: (* indicates patient's pain)   MMT L MMT R   PF/Inv **    DF/Inv 5    PF/Ev **    DF/Ev  5        Ankle/Foot Mobilizations (hyper vs hypo): Right foot mobility WNL. Left foot mobility WNL when able to assess. Pain with the left foot near the distal joints of the cuboid and the joints of the 4th and 5th metatarsals.     Palpation: Very tender at the cuboid and the 4th and 5th metatarsal bases on the left foot.     Special Tests:   L R   Squeeze test (++)      Other: Relief with attempted distraction through the midfoot.     Assessment/Plan:       Patient is a 46 year old female with left foot complaints.    Patient has the following significant findings with corresponding treatment plan.                Diagnosis 1:  Left foot pain  Pain -  hot/cold therapy, US, manual therapy, splint/taping/bracing/orthotics, self management, education, home program and iontophoresis  Decreased ROM/flexibility - manual therapy, therapeutic exercise, therapeutic activity and home program  Decreased strength - therapeutic exercise, therapeutic activities and home program  Impaired balance - neuro re-education, gait training, therapeutic activities and home program  Impaired gait - gait training and home program  Decreased function - therapeutic activities and home program  Impaired posture - neuro re-education, therapeutic activities and home program    Therapy Evaluation Codes:   1) History comprised of:   Personal factors that impact the plan of care:      Anxiety and Overall behavior pattern.    Comorbidity factors that impact the plan of care are:      Reynaud's disease.     Medications impacting care: None.  2) Examination of Body Systems comprised of:   Body structures and functions that impact the plan of care:      Ankle and foot.   Activity limitations that impact the plan of care are:      Stairs and Walking.  3) Clinical presentation characteristics are:   Unstable/Unpredictable.  4) Decision-Making    Moderate complexity using standardized patient assessment instrument and/or measureable assessment of functional outcome.  Cumulative Therapy Evaluation is: Moderate complexity.    Previous and current functional limitations:  (See Goal Flow Sheet for this information)    Short term and Long term goals: (See Goal Flow Sheet for this information)     Communication ability:  Patient appears to be able to clearly communicate and understand verbal and written communication and follow directions correctly.  Treatment Explanation - The following has been discussed with  the patient:   RX ordered/plan of care  Anticipated outcomes  Possible risks and side effects  This patient would benefit from PT intervention to resume normal activities.   Rehab potential is good.    Frequency:  2 X week, once daily  Duration:  for 5 weeks  Discharge Plan:  Achieve all LTG.  Independent in home treatment program.  Reach maximal therapeutic benefit.    Please refer to the daily flowsheet for treatment today, total treatment time and time spent performing 1:1 timed codes.

## 2017-01-19 ENCOUNTER — THERAPY VISIT (OUTPATIENT)
Dept: PHYSICAL THERAPY | Facility: CLINIC | Age: 47
End: 2017-01-19
Payer: COMMERCIAL

## 2017-01-19 DIAGNOSIS — M79.672 LEFT FOOT PAIN: Primary | ICD-10-CM

## 2017-01-19 PROCEDURE — 97140 MANUAL THERAPY 1/> REGIONS: CPT | Mod: GP | Performed by: PHYSICAL THERAPIST

## 2017-01-19 PROCEDURE — 97033 APP MDLTY 1+IONTPHRSIS EA 15: CPT | Mod: GP | Performed by: PHYSICAL THERAPIST

## 2017-01-23 ENCOUNTER — THERAPY VISIT (OUTPATIENT)
Dept: PHYSICAL THERAPY | Facility: CLINIC | Age: 47
End: 2017-01-23
Payer: COMMERCIAL

## 2017-01-23 DIAGNOSIS — M79.672 LEFT FOOT PAIN: Primary | ICD-10-CM

## 2017-01-23 PROCEDURE — 97035 APP MDLTY 1+ULTRASOUND EA 15: CPT | Mod: GP | Performed by: PHYSICAL THERAPIST

## 2017-01-23 PROCEDURE — 97140 MANUAL THERAPY 1/> REGIONS: CPT | Mod: GP | Performed by: PHYSICAL THERAPIST

## 2017-01-26 ENCOUNTER — THERAPY VISIT (OUTPATIENT)
Dept: PHYSICAL THERAPY | Facility: CLINIC | Age: 47
End: 2017-01-26
Payer: COMMERCIAL

## 2017-01-26 DIAGNOSIS — M79.672 LEFT FOOT PAIN: Primary | ICD-10-CM

## 2017-01-26 PROCEDURE — 97110 THERAPEUTIC EXERCISES: CPT | Mod: GP | Performed by: PHYSICAL THERAPIST

## 2017-01-26 PROCEDURE — 97035 APP MDLTY 1+ULTRASOUND EA 15: CPT | Mod: GP | Performed by: PHYSICAL THERAPIST

## 2017-01-30 ENCOUNTER — THERAPY VISIT (OUTPATIENT)
Dept: PHYSICAL THERAPY | Facility: CLINIC | Age: 47
End: 2017-01-30
Payer: COMMERCIAL

## 2017-01-30 DIAGNOSIS — M79.672 LEFT FOOT PAIN: Primary | ICD-10-CM

## 2017-01-30 PROCEDURE — 97112 NEUROMUSCULAR REEDUCATION: CPT | Mod: GP | Performed by: PHYSICAL THERAPIST

## 2017-01-30 PROCEDURE — 97035 APP MDLTY 1+ULTRASOUND EA 15: CPT | Mod: GP | Performed by: PHYSICAL THERAPIST

## 2017-02-02 ENCOUNTER — THERAPY VISIT (OUTPATIENT)
Dept: PHYSICAL THERAPY | Facility: CLINIC | Age: 47
End: 2017-02-02
Payer: COMMERCIAL

## 2017-02-02 DIAGNOSIS — M79.672 LEFT FOOT PAIN: Primary | ICD-10-CM

## 2017-02-02 PROCEDURE — 97110 THERAPEUTIC EXERCISES: CPT | Mod: GP | Performed by: PHYSICAL THERAPIST

## 2017-02-02 PROCEDURE — 97140 MANUAL THERAPY 1/> REGIONS: CPT | Mod: GP | Performed by: PHYSICAL THERAPIST

## 2017-02-09 ENCOUNTER — THERAPY VISIT (OUTPATIENT)
Dept: PHYSICAL THERAPY | Facility: CLINIC | Age: 47
End: 2017-02-09
Payer: COMMERCIAL

## 2017-02-09 DIAGNOSIS — M79.672 LEFT FOOT PAIN: Primary | ICD-10-CM

## 2017-02-09 PROCEDURE — 97033 APP MDLTY 1+IONTPHRSIS EA 15: CPT | Mod: GP | Performed by: PHYSICAL THERAPIST

## 2017-02-09 PROCEDURE — 97140 MANUAL THERAPY 1/> REGIONS: CPT | Mod: GP | Performed by: PHYSICAL THERAPIST

## 2017-02-09 PROCEDURE — 97110 THERAPEUTIC EXERCISES: CPT | Mod: GP | Performed by: PHYSICAL THERAPIST

## 2017-02-13 ENCOUNTER — THERAPY VISIT (OUTPATIENT)
Dept: PHYSICAL THERAPY | Facility: CLINIC | Age: 47
End: 2017-02-13
Payer: COMMERCIAL

## 2017-02-13 DIAGNOSIS — M79.672 LEFT FOOT PAIN: ICD-10-CM

## 2017-02-13 PROCEDURE — 97033 APP MDLTY 1+IONTPHRSIS EA 15: CPT | Mod: GP | Performed by: PHYSICAL THERAPIST

## 2017-02-16 ENCOUNTER — THERAPY VISIT (OUTPATIENT)
Dept: PHYSICAL THERAPY | Facility: CLINIC | Age: 47
End: 2017-02-16
Payer: COMMERCIAL

## 2017-02-16 DIAGNOSIS — M79.672 LEFT FOOT PAIN: ICD-10-CM

## 2017-02-16 PROCEDURE — 97033 APP MDLTY 1+IONTPHRSIS EA 15: CPT | Mod: GP | Performed by: PHYSICAL THERAPIST

## 2017-02-16 PROCEDURE — 97530 THERAPEUTIC ACTIVITIES: CPT | Mod: GP | Performed by: PHYSICAL THERAPIST

## 2017-02-16 PROCEDURE — 97140 MANUAL THERAPY 1/> REGIONS: CPT | Mod: GP | Performed by: PHYSICAL THERAPIST

## 2017-02-23 ENCOUNTER — THERAPY VISIT (OUTPATIENT)
Dept: PHYSICAL THERAPY | Facility: CLINIC | Age: 47
End: 2017-02-23
Payer: COMMERCIAL

## 2017-02-23 DIAGNOSIS — M79.672 LEFT FOOT PAIN: ICD-10-CM

## 2017-02-23 PROCEDURE — 97140 MANUAL THERAPY 1/> REGIONS: CPT | Mod: GP | Performed by: PHYSICAL THERAPIST

## 2017-02-23 PROCEDURE — 97033 APP MDLTY 1+IONTPHRSIS EA 15: CPT | Mod: GP | Performed by: PHYSICAL THERAPIST

## 2017-03-01 ENCOUNTER — THERAPY VISIT (OUTPATIENT)
Dept: PHYSICAL THERAPY | Facility: CLINIC | Age: 47
End: 2017-03-01
Payer: COMMERCIAL

## 2017-03-01 DIAGNOSIS — M79.672 LEFT FOOT PAIN: ICD-10-CM

## 2017-03-01 PROCEDURE — 97033 APP MDLTY 1+IONTPHRSIS EA 15: CPT | Mod: GP | Performed by: PHYSICAL THERAPIST

## 2017-03-01 PROCEDURE — 97140 MANUAL THERAPY 1/> REGIONS: CPT | Mod: GP | Performed by: PHYSICAL THERAPIST

## 2017-03-01 NOTE — PROGRESS NOTES
Subjective:    HPI                    Objective:    System    Physical Exam    General     ROS    Assessment/Plan:      PROGRESS  REPORT    Progress reporting period is from 1/16/2017 to 3/1/2017.       SUBJECTIVE  Subjective changes noted by patient:  Her foot pain is improving, but not completely better. She had increased pain after having to take the stairs several times at work due to a broken elevator. She is able to walk without the boot and only has a deviation when the pain in increased above normal (>3/10)   Subjective: Pt started getting more foot pain after working a 14 hour day yesterday that included over 10 flights of stairs.     Current Pain level: 3/10.     Initial Pain level: 8/10.   Changes in function:  Yes (See Goal flowsheet attached for changes in current functional level)  Adverse reaction to treatment or activity: None    OBJECTIVE  Changes noted in objective findings:  Yes, Patient has demonstrated improved gait, but still has antalgic pattern when ascending and descending steps. Tenderness is better, but still present. Patient responds well to low grade ankle distraction. Iontophoresis has helped reduced symptoms.   Objective: Tenderness greatest with medial/lateral foot compression. Pressure at the midfoot creates shooting pain.      ASSESSMENT/PLAN  Updated problem list and treatment plan: Diagnosis 1:  Left foot pain  Pain - hot/cold therapy, US, manual therapy, splint/taping/bracing/orthotics, self management, education, home program and iontophoresis  Decreased ROM/flexibility - manual therapy, therapeutic exercise, therapeutic activity and home program  Decreased strength - therapeutic exercise, therapeutic activities and home program  Impaired balance - neuro re-education, gait training, therapeutic activities and home program  Impaired gait - gait training and home program  Decreased function - therapeutic activities and home program  Impaired posture - neuro re-education,  therapeutic activities and home program  STG/LTGs have been met or progress has been made towards goals:  Yes (See Goal flow sheet completed today.)  Assessment of Progress: The patient's condition is improving.  The patient's condition has potential to improve.  Patient is meeting short term goals and is progressing towards long term goals.  Self Management Plans:  Patient has been instructed in a home treatment program.  Patient  has been instructed in self management of symptoms.  I have re-evaluated this patient and find that the nature, scope, duration and intensity of the therapy is appropriate for the medical condition of the patient.  Fadia continues to require the following intervention to meet STG and LTG's:  PT    Recommendations:  This patient would benefit from continued therapy.     Frequency:  2 X week, once daily  Duration:  for 6 visits        Please refer to the daily flowsheet for treatment today, total treatment time and time spent performing 1:1 timed codes.

## 2017-03-06 ENCOUNTER — THERAPY VISIT (OUTPATIENT)
Dept: PHYSICAL THERAPY | Facility: CLINIC | Age: 47
End: 2017-03-06
Payer: COMMERCIAL

## 2017-03-06 DIAGNOSIS — M79.672 LEFT FOOT PAIN: ICD-10-CM

## 2017-03-06 PROCEDURE — 97140 MANUAL THERAPY 1/> REGIONS: CPT | Mod: GP | Performed by: PHYSICAL THERAPY ASSISTANT

## 2017-03-06 PROCEDURE — 97033 APP MDLTY 1+IONTPHRSIS EA 15: CPT | Mod: GP | Performed by: PHYSICAL THERAPY ASSISTANT

## 2017-03-06 NOTE — MR AVS SNAPSHOT
"              After Visit Summary   3/6/2017    Fadia Alexandra    MRN: [id number]           Patient Information     Date Of Birth          1970        Visit Information        Provider Department      3/6/2017 7:00 AM Shirley Man, [facility]        Today's Diagnoses     Left foot pain           Follow-ups after your visit        Your next 10 appointments already scheduled     Mar 10, 2017  7:30 AM CST   [facility] Extremity with Aung Simpson, PT   [facility]    [address]  [address]  [place] [address]   [phone]            Mar 13, 2017  7:40 AM CDT   [facility] Extremity with Shirley Man, PTA   [facility] ([facility] [place]  )    [address]  [address]  [place] [address]   [phone]              Who to contact     If you have questions or need follow up information about today's clinic visit or your schedule please contact [facility] directly at [phone].  Normal or non-critical lab and imaging results will be communicated to you by Aggregate Knowledgehart, letter or phone within 4 business days after the clinic has received the results. If you do not hear from us within 7 days, please contact the clinic through Logical Appst or phone. If you have a critical or abnormal lab result, we will notify you by phone as soon as possible.  Submit refill requests through RegistryLove or call your pharmacy and they will forward the refill request to us. Please allow 3 business days for your refill to be completed.          Additional Information About Your Visit        Aggregate KnowledgeharSaaSMAX Information     RegistryLove lets you send messages to your doctor, view your test results, renew your prescriptions, schedule appointments and more. To sign up, go to [email or web address] . Click on \"Log in\" on the left side of the screen, which will take you to the Welcome page. Then click on \"Sign up Now\" on the right side of the page.     You will be asked to enter the access code listed " below, as well as some personal information. Please follow the directions to create your username and password.     Your access code is: [email or web address]  Expires: 4/10/2017  4:22 PM     Your access code will  in 90 days. If you need help or a new code, please call your [facility] clinic or [phone].        Care EveryWhere ID     This is your Care EveryWhere ID. This could be used by other organizations to access your [facility] medical records  [id number]         Blood Pressure from Last 3 Encounters:   01/10/17 106/62    Weight from Last 3 Encounters:   01/10/17 85.3 kg (188 lb)              We Performed the Following     Iontophoresis     Manual Ther Tech, 1+Regions, EA 15 min        Primary Care Provider    None Specified       No primary provider on file.        Thank you!     Thank you for choosing [facility]  for your care. Our goal is always to provide you with excellent care. Hearing back from our patients is one way we can continue to improve our services. Please take a few minutes to complete the written survey that you may receive in the mail after your visit with us. Thank you!             Your Updated Medication List - Protect others around you: Learn how to safely use, store and throw away your medicines at www.disposemymeds.org.          This list is accurate as of: 3/6/17  7:42 AM.  Always use your most recent med list.                   Brand Name Dispense Instructions for use    dexamethasone 4 MG/ML injection    DECADRON    30 mL    Apply 1 mL (4 mg) topically once for 1 dose For physical therapy, iontophoresis       ibuprofen 600 MG tablet    ADVIL/MOTRIN     Take 600 mg by mouth       order for DME     1 Device    Equipment being ordered: tall cast boot.

## 2017-05-22 NOTE — PROGRESS NOTES
Subjective:    HPI                    Objective:    System    Physical Exam    General     ROS    Assessment/Plan:      DISCHARGE REPORT    Progress reporting period is from 1/16/17 to 3/6/17.      SUBJECTIVE  Subjective changes noted by patient:   Patient reports that she walked on uneven ground and that she had more pain with ambulation up the stairs and hills.   Current pain level is .  Current Pain level: 1/10 (did increase when walking on uneven ground 4/10)    Previous pain level was:   Initial Pain level: 8/10   Changes in function:  Yes (See Goal flowsheet attached for changes in current functional level)     Adverse reaction to treatment or activity: None     OBJECTIVE  Changes noted in objective findings:  Patient has failed to return to therapy so current objective findings are unknown.  Objective: Trial of anterior ankle stretch, soleus stretch on the chair.  DF with the use of the belt unloaded with no change.  Patient was cautious with any changes of the treatment today.

## 2017-05-26 NOTE — PROGRESS NOTES
DISCHARGE REPORT      ASSESSMENT/PLAN  Updated problem list and treatment plan: Diagnosis 1:  Left Foot Pain  STG/LTGs have been met or progress has been made towards goals:  Unknown. Patient has failed to return to clinic.  Assessment of Progress: The patient has not returned to therapy. Current status is unknown.  Self Management Plans:  Patient has been instructed in a home treatment program.  Clau continues to require the following intervention to meet STG and LTG's:  Unknown. Patient has failed to return to clinic.    Recommendations:  Unable to make an accurate recommendation at this time. Patient has failed to return to clinic.    Please refer to the daily flowsheet for treatment today, total treatment time and time spent performing 1:1 timed codes.

## 2017-11-13 ENCOUNTER — HOSPITAL ENCOUNTER (EMERGENCY)
Facility: CLINIC | Age: 47
Discharge: HOME | End: 2017-11-13
Attending: EMERGENCY MEDICINE | Admitting: EMERGENCY MEDICINE
Payer: COMMERCIAL

## 2017-11-13 ENCOUNTER — APPOINTMENT (OUTPATIENT)
Dept: MRI IMAGING | Facility: CLINIC | Age: 47
End: 2017-11-13
Attending: EMERGENCY MEDICINE
Payer: COMMERCIAL

## 2017-11-13 VITALS
DIASTOLIC BLOOD PRESSURE: 79 MMHG | WEIGHT: 188 LBS | OXYGEN SATURATION: 97 % | SYSTOLIC BLOOD PRESSURE: 133 MMHG | RESPIRATION RATE: 16 BRPM | TEMPERATURE: 97.9 F | BODY MASS INDEX: 33.31 KG/M2 | HEIGHT: 63 IN

## 2017-11-13 DIAGNOSIS — R42 VERTIGO: ICD-10-CM

## 2017-11-13 PROBLEM — M54.50 LOW BACK PAIN: Status: ACTIVE | Noted: 2017-10-23

## 2017-11-13 PROBLEM — R51.9 CHRONIC HEADACHE: Status: ACTIVE | Noted: 2017-10-23

## 2017-11-13 PROBLEM — G89.29 CHRONIC HEADACHE: Status: ACTIVE | Noted: 2017-10-23

## 2017-11-13 PROBLEM — M54.2 NECK PAIN: Status: ACTIVE | Noted: 2017-10-23

## 2017-11-13 PROBLEM — M54.12 CERVICAL RADICULITIS: Status: ACTIVE | Noted: 2017-10-23

## 2017-11-13 PROBLEM — J45.20 MILD INTERMITTENT ASTHMA: Status: ACTIVE | Noted: 2017-10-20

## 2017-11-13 PROBLEM — M54.17 LUMBOSACRAL RADICULITIS: Status: ACTIVE | Noted: 2017-10-23

## 2017-11-13 LAB
ANION GAP SERPL CALCULATED.3IONS-SCNC: 7 MMOL/L (ref 3–14)
BASOPHILS # BLD AUTO: 0.1 10E9/L (ref 0–0.2)
BASOPHILS NFR BLD AUTO: 1.1 %
BUN SERPL-MCNC: 11 MG/DL (ref 7–30)
CALCIUM SERPL-MCNC: 8.6 MG/DL (ref 8.5–10.1)
CHLORIDE SERPL-SCNC: 106 MMOL/L (ref 94–109)
CO2 SERPL-SCNC: 24 MMOL/L (ref 20–32)
CREAT SERPL-MCNC: 0.7 MG/DL (ref 0.52–1.04)
DIFFERENTIAL METHOD BLD: NORMAL
EOSINOPHIL # BLD AUTO: 0.2 10E9/L (ref 0–0.7)
EOSINOPHIL NFR BLD AUTO: 2.9 %
ERYTHROCYTE [DISTWIDTH] IN BLOOD BY AUTOMATED COUNT: 12.1 % (ref 10–15)
GFR SERPL CREATININE-BSD FRML MDRD: >90 ML/MIN/1.7M2
GLUCOSE SERPL-MCNC: 84 MG/DL (ref 70–99)
HCT VFR BLD AUTO: 42.5 % (ref 35–47)
HGB BLD-MCNC: 14.3 G/DL (ref 11.7–15.7)
IMM GRANULOCYTES # BLD: 0 10E9/L (ref 0–0.4)
IMM GRANULOCYTES NFR BLD: 0.2 %
LYMPHOCYTES # BLD AUTO: 1.5 10E9/L (ref 0.8–5.3)
LYMPHOCYTES NFR BLD AUTO: 23.6 %
MCH RBC QN AUTO: 30.2 PG (ref 26.5–33)
MCHC RBC AUTO-ENTMCNC: 33.6 G/DL (ref 31.5–36.5)
MCV RBC AUTO: 90 FL (ref 78–100)
MONOCYTES # BLD AUTO: 0.5 10E9/L (ref 0–1.3)
MONOCYTES NFR BLD AUTO: 8 %
NEUTROPHILS # BLD AUTO: 4.2 10E9/L (ref 1.6–8.3)
NEUTROPHILS NFR BLD AUTO: 64.2 %
NRBC # BLD AUTO: 0 10*3/UL
NRBC BLD AUTO-RTO: 0 /100
PLATELET # BLD AUTO: 294 10E9/L (ref 150–450)
POTASSIUM SERPL-SCNC: 3.4 MMOL/L (ref 3.4–5.3)
RBC # BLD AUTO: 4.74 10E12/L (ref 3.8–5.2)
SODIUM SERPL-SCNC: 137 MMOL/L (ref 133–144)
WBC # BLD AUTO: 6.5 10E9/L (ref 4–11)

## 2017-11-13 PROCEDURE — 80048 BASIC METABOLIC PNL TOTAL CA: CPT | Performed by: EMERGENCY MEDICINE

## 2017-11-13 PROCEDURE — A9585 GADOBUTROL INJECTION: HCPCS | Performed by: EMERGENCY MEDICINE

## 2017-11-13 PROCEDURE — 85025 COMPLETE CBC W/AUTO DIFF WBC: CPT | Performed by: EMERGENCY MEDICINE

## 2017-11-13 PROCEDURE — 25000128 H RX IP 250 OP 636: Performed by: EMERGENCY MEDICINE

## 2017-11-13 PROCEDURE — 99285 EMERGENCY DEPT VISIT HI MDM: CPT | Mod: 25

## 2017-11-13 PROCEDURE — 93005 ELECTROCARDIOGRAM TRACING: CPT

## 2017-11-13 PROCEDURE — 70553 MRI BRAIN STEM W/O & W/DYE: CPT

## 2017-11-13 PROCEDURE — 96374 THER/PROPH/DIAG INJ IV PUSH: CPT | Mod: 59

## 2017-11-13 RX ORDER — MECLIZINE HCL 12.5 MG 12.5 MG/1
12.5 TABLET ORAL 4 TIMES DAILY PRN
Qty: 30 TABLET | Refills: 0 | Status: SHIPPED | OUTPATIENT
Start: 2017-11-13 | End: 2022-09-07

## 2017-11-13 RX ORDER — LORAZEPAM 2 MG/ML
0.5 INJECTION INTRAMUSCULAR ONCE
Status: COMPLETED | OUTPATIENT
Start: 2017-11-13 | End: 2017-11-13

## 2017-11-13 RX ORDER — GADOBUTROL 604.72 MG/ML
10 INJECTION INTRAVENOUS ONCE
Status: COMPLETED | OUTPATIENT
Start: 2017-11-13 | End: 2017-11-13

## 2017-11-13 RX ORDER — ALBUTEROL SULFATE 90 UG/1
2 AEROSOL, METERED RESPIRATORY (INHALATION) EVERY 6 HOURS
COMMUNITY

## 2017-11-13 RX ADMIN — LORAZEPAM 0.5 MG: 2 INJECTION INTRAMUSCULAR at 20:16

## 2017-11-13 RX ADMIN — GADOBUTROL 8 ML: 604.72 INJECTION INTRAVENOUS at 20:56

## 2017-11-13 ASSESSMENT — ENCOUNTER SYMPTOMS
COUGH: 1
DIZZINESS: 1
BACK PAIN: 1
FATIGUE: 1
RHINORRHEA: 1

## 2017-11-13 NOTE — ED AVS SNAPSHOT
[facility] Emergency Department    [address]    [facility] [address]    Phone:  [phone]    Fax:  [phone]                                       Fadia Alexandra   MRN: [id number]    Department:  [facility] Emergency Department   Date of Visit:  11/13/2017           Patient Information     Date Of Birth          1970        Your diagnoses for this visit were:     Vertigo        You were seen by Angella Hoyt MD.      Follow-up Information     Follow up with [facility] Emergency Department.    Specialty:  EMERGENCY MEDICINE    Why:  immediately , If symptoms worsen    Contact information:    [address]  [facility] [address] [phone]        Follow up with No Ref-Primary, Physician In 3 days.    Why:  for persistent symptoms    Contact information:    NO REF-PRIMARY PHYSICIAN          Follow up with Your optholmalogist In 4 days.    Why:  for persistent symptoms        Discharge Instructions       Discharge Instructions  Dizziness (Lightheaded)  Today you were seen for dizziness.  Dizziness can be caused by many things.  At this time, your doctor has found no signs that your dizziness is due to a serious or life-threatening condition. However, sometimes there is a serious problem that does not show up right away, and it is important for you to follow up with your regular doctor as instructed.  The most common cause of dizziness is called a vasovagal reaction. This is the kind of dizziness people get when they have their blood drawn or see unpleasant things. This can also happen with dehydration, extreme emotion, nausea, severe pain and also sometimes with urinating or coughing.  This type of dizziness is not serious. It is more common to be lightheaded when you are warm, when you have been standing still for a long time, when you haven t eaten or had very much to drink, and many other factors. Many times there are  several things all going on together that caused your spell today. As you get older, your blood vessels get more stiff, and it is common to have dizziness, especially when you first stand up.  If you have been lying down, be sure to sit for a few minutes before standing up, and always sit right down if you feel faint.  Other causes of dizziness include heart disease, irregular heartbeat, side effects from medications, effects of drugs and alcohol, blood pressure changes, infections, and blood loss (anemia). Some of these causes can be serious and even life threatening. Be sure to follow your doctor s discharge instructions for follow up with other doctors to further evaluate your problem.      Return to the Emergency Department if:      You pass out (fainting or falling out), especially during exercise.      You develop chest pain, chest pressure or difficulty breathing.    Your feel an irregular heartbeat.    You have excessive vaginal bleeding, or blood in your stool or vomit.    You have a high fever.    Your symptoms get worse or more frequent.    If when you begin to feel dizzy, it is important to sit down or lay down immediately to prevent injury from falling.  If you were given a prescription for medicine here today, be sure to read all of the information (including the package insert) that comes with your prescription.  This will include important information about the medicine, its side effects, and any warnings that you need to know about.  The pharmacist who fills the prescription can provide more information and answer questions you may have about the medicine.  If you have questions or concerns that the pharmacist cannot address, please call or return to the Emergency Department.         24 Hour Appointment Hotline       To make an appointment at any [facility], call [phone] ([phone]). If you don't have a family doctor or clinic, we will help you find one. [facility] are  conveniently located to serve the needs of you and your family.             Review of your medicines      START taking        Dose / Directions Last dose taken    meclizine 12.5 MG tablet   Commonly known as:  ANTIVERT   Dose:  12.5 mg   Quantity:  30 tablet        Take 1 tablet (12.5 mg) by mouth 4 times daily as needed for dizziness   Refills:  0          Our records show that you are taking the medicines listed below. If these are incorrect, please call your family doctor or clinic.        Dose / Directions Last dose taken    albuterol 108 (90 BASE) MCG/ACT Inhaler   Commonly known as:  PROAIR HFA/PROVENTIL HFA/VENTOLIN HFA   Dose:  2 puff        Inhale 2 puffs into the lungs every 6 hours   Refills:  0        dexamethasone 4 MG/ML injection   Commonly known as:  DECADRON   Dose:  4 mg   Quantity:  30 mL        Apply 1 mL (4 mg) topically once for 1 dose For physical therapy, iontophoresis   Refills:  0        ibuprofen 600 MG tablet   Commonly known as:  ADVIL/MOTRIN   Dose:  600 mg        Take 600 mg by mouth   Refills:  0        order for DME   Quantity:  1 Device        Equipment being ordered: tall cast boot.   Refills:  0                Prescriptions were sent or printed at these locations (1 Prescription)                   Other Prescriptions                Printed at Department/Unit printer (1 of 1)         meclizine (ANTIVERT) 12.5 MG tablet                Procedures and tests performed during your visit     Basic metabolic panel    CBC with platelets differential    EKG 12-lead, tracing only    MR Brain w/o & w Contrast      Orders Needing Specimen Collection     None      Pending Results     Date and Time Order Name Status Description    11/13/2017 1921 EKG 12-lead, tracing only Preliminary             Pending Culture Results     No orders found from 11/11/2017 to 11/14/2017.            Pending Results Instructions     If you had any lab results that were not finalized at the time of your Discharge,  you can call the ED Lab Result RN at [phone]. You will be contacted by this team for any positive Lab results or changes in treatment. The nurses are available 7 days a week from 10A to 6:30P.  You can leave a message 24 hours per day and they will return your call.        Test Results From Your Hospital Stay        11/13/2017  7:56 PM      Component Results     Component Value Ref Range & Units Status    WBC 6.5 4.0 - 11.0 10e9/L Final    RBC Count 4.74 3.8 - 5.2 10e12/L Final    Hemoglobin 14.3 11.7 - 15.7 g/dL Final    Hematocrit 42.5 35.0 - 47.0 % Final    MCV 90 78 - 100 fl Final    MCH 30.2 26.5 - 33.0 pg Final    MCHC 33.6 31.5 - 36.5 g/dL Final    RDW 12.1 10.0 - 15.0 % Final    Platelet Count 294 150 - 450 10e9/L Final    Diff Method Automated Method  Final    % Neutrophils 64.2 % Final    % Lymphocytes 23.6 % Final    % Monocytes 8.0 % Final    % Eosinophils 2.9 % Final    % Basophils 1.1 % Final    % Immature Granulocytes 0.2 % Final    Nucleated RBCs 0 0 /100 Final    Absolute Neutrophil 4.2 1.6 - 8.3 10e9/L Final    Absolute Lymphocytes 1.5 0.8 - 5.3 10e9/L Final    Absolute Monocytes 0.5 0.0 - 1.3 10e9/L Final    Absolute Eosinophils 0.2 0.0 - 0.7 10e9/L Final    Absolute Basophils 0.1 0.0 - 0.2 10e9/L Final    Abs Immature Granulocytes 0.0 0 - 0.4 10e9/L Final    Absolute Nucleated RBC 0.0  Final         11/13/2017  8:11 PM      Component Results     Component Value Ref Range & Units Status    Sodium 137 133 - 144 mmol/L Final    Potassium 3.4 3.4 - 5.3 mmol/L Final    Chloride 106 94 - 109 mmol/L Final    Carbon Dioxide 24 20 - 32 mmol/L Final    Anion Gap 7 3 - 14 mmol/L Final    Glucose 84 70 - 99 mg/dL Final    Urea Nitrogen 11 7 - 30 mg/dL Final    Creatinine 0.70 0.52 - 1.04 mg/dL Final    GFR Estimate >90 >60 mL/min/1.7m2 Final    Non  GFR Calc    GFR Estimate If Black >90 >60 mL/min/1.7m2 Final    African American GFR Calc    Calcium 8.6 8.5 - 10.1 mg/dL Final          11/13/2017  9:33 PM      Narrative     MRI BRAIN WITHOUT AND WITH CONTRAST  11/13/2017 8:58 PM    HISTORY: Vertigo, double vision. Double vision since Thursday,  dizziness since Saturday. Tingling into the arms.    TECHNIQUE: Multiplanar, multisequence MRI of the brain without and  with 8 mL Gadavist.    COMPARISON: None.    FINDINGS: The brain parenchyma, ventricles and subarachnoid spaces  appear normal. There is no evidence of hemorrhage, mass, acute  infarct, or anomaly. There are no gadolinium enhancing lesions.    The facial structures appear normal. The arteries at the base of the  brain and the dural venous sinuses appear patent.         Impression     IMPRESSION: Normal MRI of the brain.    SARAH BETH ELLIOTT MD                Clinical Quality Measure: Blood Pressure Screening     Your blood pressure was checked while you were in the emergency department today. The last reading we obtained was  BP: 133/79 . Please read the guidelines below about what these numbers mean and what you should do about them.  If your systolic blood pressure (the top number) is less than 120 and your diastolic blood pressure (the bottom number) is less than 80, then your blood pressure is normal. There is nothing more that you need to do about it.  If your systolic blood pressure (the top number) is 120-139 or your diastolic blood pressure (the bottom number) is 80-89, your blood pressure may be higher than it should be. You should have your blood pressure rechecked within a year by a primary care provider.  If your systolic blood pressure (the top number) is 140 or greater or your diastolic blood pressure (the bottom number) is 90 or greater, you may have high blood pressure. High blood pressure is treatable, but if left untreated over time it can put you at risk for heart attack, stroke, or kidney failure. You should have your blood pressure rechecked by a primary care provider within the next 4 weeks.  If your provider in the  "emergency department today gave you specific instructions to follow-up with your doctor or provider even sooner than that, you should follow that instruction and not wait for up to 4 weeks for your follow-up visit.        Thank you for choosing [facility]       Thank you for choosing [facility] for your care. Our goal is always to provide you with excellent care. Hearing back from our patients is one way we can continue to improve our services. Please take a few minutes to complete the written survey that you may receive in the mail after you visit with us. Thank you!        [employer] Information     [employer] lets you send messages to your doctor, view your test results, renew your prescriptions, schedule appointments and more. To sign up, go to [email or web address] . Click on \"Log in\" on the left side of the screen, which will take you to the Welcome page. Then click on \"Sign up Now\" on the right side of the page.     You will be asked to enter the access code listed below, as well as some personal information. Please follow the directions to create your username and password.     Your access code is: [email or web address]  Expires: 2018 10:26 PM     Your access code will  in 90 days. If you need help or a new code, please call your [facility] clinic or [phone].        Care EveryWhere ID     This is your Care EveryWhere ID. This could be used by other organizations to access your [facility] medical records  [id number]        Equal Access to Services     HALLIE PEREZ : Hadii maciel villanueva Sogilma, waaxda luqadaha, qaybta kaalmada adeaudiyada, nikki cullen . So [facility] [phone].    ATENCIÓN: Si habla español, tiene a macdonald disposición servicios gratuitos de asistencia lingüística. Llame al [phone].    We comply with applicable federal civil rights laws and [place] laws. We do not discriminate on the basis of race, color, national origin, age, disability, sex, sexual orientation, or " gender identity.            After Visit Summary       This is your record. Keep this with you and show to your community pharmacist(s) and doctor(s) at your next visit.

## 2017-11-13 NOTE — ED AVS SNAPSHOT
[facility] Emergency Department    [address]    [facility] [address]    Phone:  [phone]    Fax:  [phone]                                       Fadia Alexandra   MRN: [id number]    Department:  [facility] Emergency Department   Date of Visit:  11/13/2017           After Visit Summary Signature Page     I have received my discharge instructions, and my questions have been answered. I have discussed any challenges I see with this plan with the nurse or doctor.    ..........................................................................................................................................  Patient/Patient Representative Signature      ..........................................................................................................................................  Patient Representative Print Name and Relationship to Patient    ..................................................               ................................................  Date                                            Time    ..........................................................................................................................................  Reviewed by Signature/Title    ...................................................              ..............................................  Date                                                            Time

## 2017-11-13 NOTE — ED NOTES
Pt has room spinning dizziness when turning her head to the left side.  She has been working long hours on a computer and noted double vision when she sees objects in a stacked position.

## 2017-11-14 LAB — INTERPRETATION ECG - MUSE: NORMAL

## 2017-11-14 NOTE — ED PROVIDER NOTES
History     Chief Complaint:  Dizziness    HPI   Fadia Alexandra is a 47 year old female with history of cerebral infarction who presents to the emergency department today for evaluation of dizziness. The patient reports ongoing chronic back pain that she is scheduled to begin physical therapy for. 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 However, three days ago she reports vertical diplopia with images in a stacked position. Additionally, she reports ongoing vertigo and dizziness when she moves her head to the left. She was seen in urgent care today at which time she was found to have a positive Romberg test and strabismus and was referred to the emergency department to rule out a central cause of her symptoms. Patient states she experiences dizziness and sensation of the room spinning even with her eyes closed, but it is brought on with turning her head to the left. The patient also reports history of pinched nerves which causes tingling in her arms, however notes she has recently had this tingling sensation to both sides of her neck. She also reports a sensation of pressure in the back of her neck, but notes this has happened previously. The patient denies any ringing in her ears or hearing difficulty.  Additionally, the patient reports intermittent cough and congestion recently. She state she has history of exercise induced asthma as a child which has returned recently. She states she has been using her albuterol inhaler for this.     Allergies:  Penicillins      Medications:    albuterol (PROAIR HFA/PROVENTIL HFA/VENTOLIN HFA) 108 (90 BASE) MCG/ACT Inhaler  dexamethasone (DECADRON) 4 MG/ML injection    Past Medical History:    Cerebral infarction  Asthma     Past Surgical History:    Appendectomy   Cholecystectomy   GI surgery  Gyn surgery     Family History:    History reviewed. No pertinent  "family history.      Social History:  The patient was accompanied to the ED by her .  Smoking Status: Never smoker  Smokeless Tobacco: No   Alcohol Use: Yes   Marital Status:        Review of Systems   Constitutional: Positive for fatigue.   HENT: Positive for rhinorrhea. Negative for tinnitus.    Eyes: Positive for visual disturbance (diplopia).   Respiratory: Positive for cough.    Musculoskeletal: Positive for back pain (chronic).   Neurological: Positive for dizziness.   All other systems reviewed and are negative.    Physical Exam     Patient Vitals for the past 24 hrs:   BP Temp Temp src Heart Rate Resp SpO2 Height Weight   11/13/17 1709 (!) 122/98 97.9  F (36.6  C) Oral 110 18 97 % 1.6 m (5' 3\") 85.3 kg (188 lb)      Physical Exam   Gen: Pleasant, appears stated age.    Eye:   Pupils are equal, round, and reactive.     Sclera non-injected.    ENT:   Moist mucus membranes.     Normal tongue.    Oropharynx without lesions.   TM clear bilaterally.    Cardiac:     Normal rate and regular rhythm.    No murmurs, gallops, or rubs.    Pulmonary:     Clear to auscultation bilaterally.    No wheezes, rales, or rhonchi.    Abdomen:     Normal active bowel sounds.     Abdomen is soft and non-distended, without focal tenderness.    Musculoskeletal:     Normal movement of all extremities without evidence for deficit.    Extremities:    No edema.    Skin:   Warm and dry.    Neurologic:     Speech is fluent, cognition is normal.    Long and short term memory intact     Right eye drifts to right lower quadrant compared to left.     CN's II-XII intact; EOM intact, symmetric grimace.      UE strength: 5/5 triceps, biceps, ; symmetric bilaterally     LE strength:   5/5 DF, PF, HF, KE; symmetric bilaterally.      Negative Pronator drift.    Normal muscle tone.    Light touch is symmetrical bilateral UE's and LE's.    Reflexes are 2+ and symmetrical.        Finger to Nose and heel to shin testing is intact " bilaterally.      Psychiatric:     Normal affect with appropriate interaction with examiner.     Emergency Department Course     ECG:  Indication: Dizziness  Completed at 1927.  Read at 1931.   Sinus rhythm with marked sinus arrhythmia. Otherwise normal ECG.   Rate 76 bpm. NM interval 132. QRS duration 80. QT/QTc 394/443. P-R-T axes 43 7 21.     Imaging:  Radiology findings were communicated with the patient who voiced understanding of the findings.    MR Brain w/o and w Contrast:  IMPRESSION: Normal MRI of the brain.  Report per radiology      Laboratory:  Laboratory findings were communicated with the patient who voiced understanding of the findings.    CBC: WBC 6.5, HGB 14.3,   BMP:  AWNL (Creatinine 0.70)     Interventions:  2016 Ativan 0.5mg IV      Emergency Department Course:  Nursing notes and vitals reviewed.  1904 I performed an exam of the patient as documented above.   EKG obtained in the ED, see results above.    IV was inserted and blood was drawn for laboratory testing, results above.   The patient was sent for a MR Brain while in the emergency department, results above.    2210 Patient rechecked and updated. Attempted the Epley maneuver with minimal relief.   I personally reviewed the laboratory and imaging results with the Patient and spouse and answered all related questions prior to discharge.   Impression & Plan      Medical Decision Making:  Fadia Brooks is a 47 year old female with history of TIA while pregnant who presents today with dizziness and double vision. On exam, the patient had some strabismus although it is unclear if this is new or old. Otherwise, she seemed to have marked vertigo and some nystagmus with leftward gaze. MRI was obtained given history of double vision, mild headache, and vertigo. Fortunately, this is negative for any intracranial hemorrhage, stroke, mass, or other acute abnormality. Otherwise, symptoms in the emergency department have been fairly mild. We  attempted Epley maneuver with minimal relief. I plan to discharge her home with meclizine as needed for symptoms. Given the negative MRI, and otherwise fairly unremarkable neurologic exam I suspect she is having a mild labyrinthitis. She had a recent viral infection which could have precipitated this. Given concerns over strabismus I did recommend follow up with her optometrist or ophthalmologist for evaluation of her eyes. She will return to the emergency department for worsening symptoms, or new numbness or weakness, or for other concerns.     Diagnosis:    ICD-10-CM    1. Vertigo R42        Disposition:  Discharged to home with the below prescription.    Discharge Medications:  Discharge Medication List as of 11/13/2017 10:30 PM      START taking these medications    Details   meclizine (ANTIVERT) 12.5 MG tablet Take 1 tablet (12.5 mg) by mouth 4 times daily as needed for dizziness, Disp-30 tablet, R-0, Local Print             Scribe Disclosure:  I, Cr Khoury, am serving as a scribe at 6:52 PM on 11/13/2017 to document services personally performed by Angella Hoyt M based on my observations and the provider's statements to me.    11/13/2017   [facility] EMERGENCY DEPARTMENT       Angella Hoyt MD  11/14/17 0185

## 2017-11-14 NOTE — DISCHARGE INSTRUCTIONS
Discharge Instructions  Dizziness (Lightheaded)  Today you were seen for dizziness.  Dizziness can be caused by many things.  At this time, your doctor has found no signs that your dizziness is due to a serious or life-threatening condition. However, sometimes there is a serious problem that does not show up right away, and it is important for you to follow up with your regular doctor as instructed.  The most common cause of dizziness is called a vasovagal reaction. This is the kind of dizziness people get when they have their blood drawn or see unpleasant things. This can also happen with dehydration, extreme emotion, nausea, severe pain and also sometimes with urinating or coughing.  This type of dizziness is not serious. It is more common to be lightheaded when you are warm, when you have been standing still for a long time, when you haven t eaten or had very much to drink, and many other factors. Many times there are several things all going on together that caused your spell today. As you get older, your blood vessels get more stiff, and it is common to have dizziness, especially when you first stand up.  If you have been lying down, be sure to sit for a few minutes before standing up, and always sit right down if you feel faint.  Other causes of dizziness include heart disease, irregular heartbeat, side effects from medications, effects of drugs and alcohol, blood pressure changes, infections, and blood loss (anemia). Some of these causes can be serious and even life threatening. Be sure to follow your doctor s discharge instructions for follow up with other doctors to further evaluate your problem.      Return to the Emergency Department if:      You pass out (fainting or falling out), especially during exercise.      You develop chest pain, chest pressure or difficulty breathing.    Your feel an irregular heartbeat.    You have excessive vaginal bleeding, or blood in your stool or vomit.    You have a high  fever.    Your symptoms get worse or more frequent.    If when you begin to feel dizzy, it is important to sit down or lay down immediately to prevent injury from falling.  If you were given a prescription for medicine here today, be sure to read all of the information (including the package insert) that comes with your prescription.  This will include important information about the medicine, its side effects, and any warnings that you need to know about.  The pharmacist who fills the prescription can provide more information and answer questions you may have about the medicine.  If you have questions or concerns that the pharmacist cannot address, please call or return to the Emergency Department.

## 2018-03-20 ENCOUNTER — HOSPITAL ENCOUNTER (EMERGENCY)
Facility: CLINIC | Age: 48
Discharge: HOME | End: 2018-03-20
Attending: EMERGENCY MEDICINE | Admitting: EMERGENCY MEDICINE
Payer: COMMERCIAL

## 2018-03-20 VITALS
DIASTOLIC BLOOD PRESSURE: 96 MMHG | SYSTOLIC BLOOD PRESSURE: 120 MMHG | RESPIRATION RATE: 22 BRPM | OXYGEN SATURATION: 98 %

## 2018-03-20 DIAGNOSIS — T78.40XA ALLERGIC REACTION, INITIAL ENCOUNTER: Primary | ICD-10-CM

## 2018-03-20 LAB
ANION GAP SERPL CALCULATED.3IONS-SCNC: 14 MMOL/L (ref 3–14)
BASOPHILS # BLD AUTO: 0 10E9/L (ref 0–0.2)
BASOPHILS NFR BLD AUTO: 0.2 %
BUN SERPL-MCNC: 19 MG/DL (ref 7–30)
CALCIUM SERPL-MCNC: 8.1 MG/DL (ref 8.5–10.1)
CHLORIDE SERPL-SCNC: 106 MMOL/L (ref 94–109)
CO2 SERPL-SCNC: 19 MMOL/L (ref 20–32)
CREAT SERPL-MCNC: 0.8 MG/DL (ref 0.52–1.04)
DIFFERENTIAL METHOD BLD: ABNORMAL
EOSINOPHIL # BLD AUTO: 0 10E9/L (ref 0–0.7)
EOSINOPHIL NFR BLD AUTO: 0 %
ERYTHROCYTE [DISTWIDTH] IN BLOOD BY AUTOMATED COUNT: 12.5 % (ref 10–15)
GFR SERPL CREATININE-BSD FRML MDRD: 76 ML/MIN/1.7M2
GLUCOSE SERPL-MCNC: 172 MG/DL (ref 70–99)
HCT VFR BLD AUTO: 38.8 % (ref 35–47)
HGB BLD-MCNC: 13.1 G/DL (ref 11.7–15.7)
IMM GRANULOCYTES # BLD: 0.1 10E9/L (ref 0–0.4)
IMM GRANULOCYTES NFR BLD: 0.5 %
LYMPHOCYTES # BLD AUTO: 1 10E9/L (ref 0.8–5.3)
LYMPHOCYTES NFR BLD AUTO: 10.3 %
MCH RBC QN AUTO: 30.8 PG (ref 26.5–33)
MCHC RBC AUTO-ENTMCNC: 33.8 G/DL (ref 31.5–36.5)
MCV RBC AUTO: 91 FL (ref 78–100)
MONOCYTES # BLD AUTO: 0.2 10E9/L (ref 0–1.3)
MONOCYTES NFR BLD AUTO: 1.8 %
NEUTROPHILS # BLD AUTO: 8.4 10E9/L (ref 1.6–8.3)
NEUTROPHILS NFR BLD AUTO: 87.2 %
NRBC # BLD AUTO: 0 10*3/UL
NRBC BLD AUTO-RTO: 0 /100
PLATELET # BLD AUTO: 342 10E9/L (ref 150–450)
POTASSIUM SERPL-SCNC: 3 MMOL/L (ref 3.4–5.3)
RBC # BLD AUTO: 4.26 10E12/L (ref 3.8–5.2)
SODIUM SERPL-SCNC: 139 MMOL/L (ref 133–144)
TROPONIN I SERPL-MCNC: 0.02 UG/L (ref 0–0.04)
WBC # BLD AUTO: 9.6 10E9/L (ref 4–11)

## 2018-03-20 PROCEDURE — 93005 ELECTROCARDIOGRAM TRACING: CPT

## 2018-03-20 PROCEDURE — 25000128 H RX IP 250 OP 636: Performed by: EMERGENCY MEDICINE

## 2018-03-20 PROCEDURE — 96361 HYDRATE IV INFUSION ADD-ON: CPT

## 2018-03-20 PROCEDURE — 84484 ASSAY OF TROPONIN QUANT: CPT | Performed by: EMERGENCY MEDICINE

## 2018-03-20 PROCEDURE — 36415 COLL VENOUS BLD VENIPUNCTURE: CPT | Performed by: EMERGENCY MEDICINE

## 2018-03-20 PROCEDURE — 99285 EMERGENCY DEPT VISIT HI MDM: CPT | Mod: 25

## 2018-03-20 PROCEDURE — 85025 COMPLETE CBC W/AUTO DIFF WBC: CPT | Performed by: EMERGENCY MEDICINE

## 2018-03-20 PROCEDURE — 25000132 ZZH RX MED GY IP 250 OP 250 PS 637: Performed by: EMERGENCY MEDICINE

## 2018-03-20 PROCEDURE — 80048 BASIC METABOLIC PNL TOTAL CA: CPT | Performed by: EMERGENCY MEDICINE

## 2018-03-20 PROCEDURE — 96374 THER/PROPH/DIAG INJ IV PUSH: CPT

## 2018-03-20 RX ORDER — LORAZEPAM 2 MG/ML
0.5 INJECTION INTRAMUSCULAR ONCE
Status: COMPLETED | OUTPATIENT
Start: 2018-03-20 | End: 2018-03-20

## 2018-03-20 RX ORDER — POTASSIUM CHLORIDE 1.5 G/1.58G
40 POWDER, FOR SOLUTION ORAL ONCE
Status: COMPLETED | OUTPATIENT
Start: 2018-03-20 | End: 2018-03-20

## 2018-03-20 RX ADMIN — SODIUM CHLORIDE 1000 ML: 9 INJECTION, SOLUTION INTRAVENOUS at 20:07

## 2018-03-20 RX ADMIN — LORAZEPAM 0.5 MG: 2 INJECTION, SOLUTION INTRAMUSCULAR; INTRAVENOUS at 20:04

## 2018-03-20 RX ADMIN — RANITIDINE 150 MG: 150 TABLET ORAL at 20:15

## 2018-03-20 RX ADMIN — POTASSIUM CHLORIDE 40 MEQ: 1.5 POWDER, FOR SOLUTION ORAL at 23:14

## 2018-03-20 ASSESSMENT — ENCOUNTER SYMPTOMS
SHORTNESS OF BREATH: 1
ABDOMINAL PAIN: 0

## 2018-03-20 NOTE — ED AVS SNAPSHOT
[facility] Emergency Department    [address]    [facility] [address]    Phone:  [phone]    Fax:  [phone]                                       Fadia Brooks   MRN: [id number]    Department:  [facility] Emergency Department   Date of Visit:  3/20/2018           After Visit Summary Signature Page     I have received my discharge instructions, and my questions have been answered. I have discussed any challenges I see with this plan with the nurse or doctor.    ..........................................................................................................................................  Patient/Patient Representative Signature      ..........................................................................................................................................  Patient Representative Print Name and Relationship to Patient    ..................................................               ................................................  Date                                            Time    ..........................................................................................................................................  Reviewed by Signature/Title    ...................................................              ..............................................  Date                                                            Time

## 2018-03-20 NOTE — ED AVS SNAPSHOT
[facility] Emergency Department    [address] [place] [address]    Phone:  [phone]    Fax:  [phone]                                       Fadia Brooks   MRN: [id number]    Department:  [facility] Emergency Department   Date of Visit:  3/20/2018           Patient Information     Date Of Birth          1970        Your diagnoses for this visit were:     Allergic reaction, initial encounter        You were seen by Melvin Robert MD.      Follow-up Information     Schedule an appointment as soon as possible for a visit with Brenda Mendoza.    Contact information:    [facility]  [address] [place] [address]  [phone]          Follow up with [facility] Emergency Department.    Specialty:  EMERGENCY MEDICINE    Why:  If symptoms worsen    Contact information:    [address] [place] [address]  [phone]        Discharge Instructions       Discharge Instructions  Allergic Reaction    An allergic reaction can result in a rash, itching, swelling, watery eyes, or a runny nose. A serious reaction can cause swelling of your mouth or throat, or difficulty breathing (wheezing). The most serious allergy is called anaphylaxis, and can be life-threatening. Many allergies result in hives, also called urticaria.       An allergy happens when the body s natural defense system (immune system) overreacts to something. The thing that triggers your allergic reaction is called an allergen. The first time you are exposed to your allergen, you may not have any reaction, but the body makes a protein called an antibody. The antibody lets the body recognize and remember the allergen.  Every time you are exposed to your allergen you get more antibody and your reaction can be more severe.      Generally, every Emergency Department visit should have a follow-up clinic visit with either a primary or a specialty  clinic/provider. Please follow-up as instructed by your emergency provider today.    Call 911 if you have:    Swelling of the lips, tongue or throat.    Hoarse voice, drooling or trouble breathing.    Chest pain or shortness of breath.    Fainting or unconsciousness.    What can I do to help myself?    If you know what caused your allergy, do not touch it, throw any of it away, and tell others not to have it around you. Wear a medical alert bracelet with a name of your allergen on it.    If you do not know what you are allergic to, keep a journal of everything that you are exposed to (foods, soaps, medicines, etc.). Take this with you when you follow up with your primary provider or specialist (Allergist). This may help determine what is causing the allergic reaction.    Take any medicines that are prescribed.    Antihistamines can decrease rash or itching. You may use Benadryl  (diphenhydramine) for rash or itching according to package directions, or use a prescription antihistamine as recommended by your provider.    For significant allergic reactions, you may have been given a prescription for an epinephrine (adrenaline) auto injector. Carry this with you at all times! Use it if you are having any symptoms of anaphylaxis.  Do not be afraid to use it. Return to the Emergency Department if you use your auto injector, call 911 if it does not resolve the symptoms. It is only meant to buy time until you can get to the Emergency Department!  If you were given a prescription for medicine here today, be sure to read all of the information (including the package insert) that comes with your prescription.  This will include important information about the medicine, its side effects, and any warnings that you need to know about.  The pharmacist who fills the prescription can provide more information and answer questions you may have about the medicine.  If you have questions or concerns that the pharmacist cannot address,  please call or return to the Emergency Department.   Remember that you can always come back to the Emergency Department if you are not able to see your regular provider in the amount of time listed above, if you get any new symptoms, or if there is anything that worries you.      24 Hour Appointment Hotline       To make an appointment at any [facility], call [phone] ([phone]). If you don't have a family doctor or clinic, we will help you find one. [facility] are conveniently located to serve the needs of you and your family.             Review of your medicines      Our records show that you are taking the medicines listed below. If these are incorrect, please call your family doctor or clinic.        Dose / Directions Last dose taken    albuterol 108 (90 BASE) MCG/ACT Inhaler   Commonly known as:  PROAIR HFA/PROVENTIL HFA/VENTOLIN HFA   Dose:  2 puff        Inhale 2 puffs into the lungs every 6 hours   Refills:  0        dexamethasone 4 MG/ML injection   Commonly known as:  DECADRON   Dose:  4 mg   Quantity:  30 mL        Apply 1 mL (4 mg) topically once for 1 dose For physical therapy, iontophoresis   Refills:  0        ibuprofen 600 MG tablet   Commonly known as:  ADVIL/MOTRIN   Dose:  600 mg        Take 600 mg by mouth   Refills:  0        meclizine 12.5 MG tablet   Commonly known as:  ANTIVERT   Dose:  12.5 mg   Quantity:  30 tablet        Take 1 tablet (12.5 mg) by mouth 4 times daily as needed for dizziness   Refills:  0        order for DME   Quantity:  1 Device        Equipment being ordered: tall cast boot.   Refills:  0                Procedures and tests performed during your visit     Basic metabolic panel    CBC with platelets differential    EKG 12-lead, tracing only    Troponin I      Orders Needing Specimen Collection     None      Pending Results     Date and Time Order Name Status Description    3/20/2018 1958 EKG 12-lead, tracing only Preliminary             Pending  Culture Results     No orders found from 3/18/2018 to 3/21/2018.            Pending Results Instructions     If you had any lab results that were not finalized at the time of your Discharge, you can call the ED Lab Result RN at [phone]. You will be contacted by this team for any positive Lab results or changes in treatment. The nurses are available 7 days a week from 10A to 6:30P.  You can leave a message 24 hours per day and they will return your call.        Test Results From Your Hospital Stay        3/20/2018  9:04 PM      Component Results     Component Value Ref Range & Units Status    Sodium 139 133 - 144 mmol/L Final    Potassium 3.0 (L) 3.4 - 5.3 mmol/L Final    Chloride 106 94 - 109 mmol/L Final    Carbon Dioxide 19 (L) 20 - 32 mmol/L Final    Anion Gap 14 3 - 14 mmol/L Final    Glucose 172 (H) 70 - 99 mg/dL Final    Urea Nitrogen 19 7 - 30 mg/dL Final    Creatinine 0.80 0.52 - 1.04 mg/dL Final    GFR Estimate 76 >60 mL/min/1.7m2 Final    Non  GFR Calc    GFR Estimate If Black >90 >60 mL/min/1.7m2 Final    African American GFR Calc    Calcium 8.1 (L) 8.5 - 10.1 mg/dL Final         3/20/2018  8:46 PM      Component Results     Component Value Ref Range & Units Status    WBC 9.6 4.0 - 11.0 10e9/L Final    RBC Count 4.26 3.8 - 5.2 10e12/L Final    Hemoglobin 13.1 11.7 - 15.7 g/dL Final    Hematocrit 38.8 35.0 - 47.0 % Final    MCV 91 78 - 100 fl Final    MCH 30.8 26.5 - 33.0 pg Final    MCHC 33.8 31.5 - 36.5 g/dL Final    RDW 12.5 10.0 - 15.0 % Final    Platelet Count 342 150 - 450 10e9/L Final    Diff Method Automated Method  Final    % Neutrophils 87.2 % Final    % Lymphocytes 10.3 % Final    % Monocytes 1.8 % Final    % Eosinophils 0.0 % Final    % Basophils 0.2 % Final    % Immature Granulocytes 0.5 % Final    Nucleated RBCs 0 0 /100 Final    Absolute Neutrophil 8.4 (H) 1.6 - 8.3 10e9/L Final    Absolute Lymphocytes 1.0 0.8 - 5.3 10e9/L Final    Absolute Monocytes 0.2 0.0 - 1.3 10e9/L  Final    Absolute Eosinophils 0.0 0.0 - 0.7 10e9/L Final    Absolute Basophils 0.0 0.0 - 0.2 10e9/L Final    Abs Immature Granulocytes 0.1 0 - 0.4 10e9/L Final    Absolute Nucleated RBC 0.0  Final         3/20/2018  9:04 PM      Component Results     Component Value Ref Range & Units Status    Troponin I ES 0.017 0.000 - 0.045 ug/L Final    The 99th percentile for upper reference range is 0.045 ug/L.  Troponin values   in the range of 0.045 - 0.120 ug/L may be associated with risks of adverse   clinical events.                  Clinical Quality Measure: Blood Pressure Screening     Your blood pressure was checked while you were in the emergency department today. The last reading we obtained was  BP: (!) 120/96 . Please read the guidelines below about what these numbers mean and what you should do about them.  If your systolic blood pressure (the top number) is less than 120 and your diastolic blood pressure (the bottom number) is less than 80, then your blood pressure is normal. There is nothing more that you need to do about it.  If your systolic blood pressure (the top number) is 120-139 or your diastolic blood pressure (the bottom number) is 80-89, your blood pressure may be higher than it should be. You should have your blood pressure rechecked within a year by a primary care provider.  If your systolic blood pressure (the top number) is 140 or greater or your diastolic blood pressure (the bottom number) is 90 or greater, you may have high blood pressure. High blood pressure is treatable, but if left untreated over time it can put you at risk for heart attack, stroke, or kidney failure. You should have your blood pressure rechecked by a primary care provider within the next 4 weeks.  If your provider in the emergency department today gave you specific instructions to follow-up with your doctor or provider even sooner than that, you should follow that instruction and not wait for up to 4 weeks for your follow-up  "visit.        Thank you for choosing [facility]       Thank you for choosing [facility] for your care. Our goal is always to provide you with excellent care. Hearing back from our patients is one way we can continue to improve our services. Please take a few minutes to complete the written survey that you may receive in the mail after you visit with us. Thank you!        [employer] Information     [employer] lets you send messages to your doctor, view your test results, renew your prescriptions, schedule appointments and more. To sign up, go to [email or web address] . Click on \"Log in\" on the left side of the screen, which will take you to the Welcome page. Then click on \"Sign up Now\" on the right side of the page.     You will be asked to enter the access code listed below, as well as some personal information. Please follow the directions to create your username and password.     Your access code is: [email or web address]  Expires: 2018 11:33 PM     Your access code will  in 90 days. If you need help or a new code, please call your [facility] clinic or [phone].        Care EveryWhere ID     This is your Care EveryWhere ID. This could be used by other organizations to access your [facility] medical records  [id number]        Equal Access to Services     HALLIE PEREZ : Hadii maciel royo Sogilma, waaxda luqadaha, qaybta kaalmada adeegyada, nikki hernandez. So [facility] [phone].    ATENCIÓN: Si habla español, tiene a macdonald disposición servicios gratuitos de asistencia lingüística. Llame al [phone].    We comply with applicable federal civil rights laws and [place] laws. We do not discriminate on the basis of race, color, national origin, age, disability, sex, sexual orientation, or gender identity.            After Visit Summary       This is your record. Keep this with you and show to your community pharmacist(s) and doctor(s) at your next visit.                  "

## 2018-03-21 LAB — INTERPRETATION ECG - MUSE: NORMAL

## 2018-03-21 NOTE — ED NOTES
Pt presents via EMS. Pt states that she was eating peanut butter sandwich this evening and started to feel like she couldn't breathe. Pt administered her own epi pen and then took 9 puffs of albuterol. EMS gave 50 mg benadryl IV, 0.2mg epi IM, 4 zofran and a duoneb in route. ABC intact. A/O x4. Pt appears anxious.

## 2018-03-21 NOTE — ED NOTES
Bed: ED21  Expected date: 3/20/18  Expected time: 7:34 PM  Means of arrival: Ambulance  Comments:  Hitesh Cruz

## 2018-03-21 NOTE — ED PROVIDER NOTES
"  History     Chief Complaint:  Allergic Reaction    The history is provided by the patient and the EMS personnel.      Fadia Brooks is an asthmatic 47 year old female who presents via EMS for evaluation of an allergic reaction. The patient reports that she ate lobster for lunch today and by the time she finished her meal and got the car, her tongue felt fuzzy and heavy, and she could hardly breathe. The patient was evaluated at [facility] and diagnosed with anaphylaxis, given benadryl and a steroid, and discharged home on Prednisone. Patient's last dose of Prednisone was at 1200 today.    Shortly prior to presentation, the patient reports eating a peanut butter sandwich and rapidly beginning to feel as if she was \"breathing through a straw\" causing her to self-administer 0.3 mg of an Epi pen. EMS were called and administered 0.2 mg Epi, 50 mg Benadryl, Zofran, and Duoneb, en route. On presentation, patient notes that her shortness of breath has largely improved. Patient notes never having been allergic to peanut butter or lobster in the past. 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 Patient denies history of anxiety, abdominal pain, or other complaint.     Allergies:  Gluten Meal  Penicillins  Shellfish-Derived Products      Medications:    Albuterol inhaler  Antivert  Decadron    Past Medical History:    Cervical radiculitis  Chronic headache  Lumbosacral radiculitis  Mild intermittent asthma  Cerebral infarction  Celiac's disease  IBS    Past Surgical History:    Appendectomy  Cholecystectomy  GI surgery  GYN surgery    Family History:    History reviewed. No pertinent family history.      Social History:  Presents via EMS   Tobacco use: Never smoker  Alcohol use: Yes (occasionally)  PCP: Brenda Mendoza    Marital Status:       Review of Systems   Respiratory: Positive for shortness of breath.    Gastrointestinal: Negative for abdominal pain. "   Allergic/Immunologic: Positive for food allergies.   All other systems reviewed and are negative.      Physical Exam     Patient Vitals for the past 24 hrs:   BP Heart Rate Resp SpO2   03/20/18 2310 - 96 - 98 %   03/20/18 2245 - 97 - 95 %   03/20/18 2045 - 99 - 98 %   03/20/18 1949 (!) 120/96 122 22 100 %      Physical Exam  General: Alert, appears well-developed and well-nourished. Cooperative.     In mild distress, appears very anxious but speaking in full sentences.  HEENT:  Head:  Atraumatic  Ears:  External ears are normal  Mouth/Throat:  Oropharynx is without erythema or exudate and mucous membranes are dry.  No swelling of oropharynx or tongue.  Normal voice.  Speaking in full sentences.   Eyes:   Conjunctivae normal and EOM are normal. No scleral icterus.    Pupils are equal, round, and reactive to light. Bilateral vessels of optic disc appear normal.   Neck:   Normal range of motion. Neck supple.  CV:  Tachycardic rate, regular rhythm, normal heart sounds and radial pulses are 2+ and symmetric.  No murmur.  Resp:  Breath sounds are clear bilaterally.  No expiratory wheezing.  Patient actively exhaling and making a moaning noise at the end of her exhale, but no detected wheezing.    Non-labored, no retractions or accessory muscle use  GI:  Abdomen is soft, no distension, no tenderness. No rebound or guarding.  No CVA tenderness bilaterally  MS:  Normal range of motion. No edema.    Normal strength in all 4 extremities.     Back atraumatic.    No midline cervical, thoracic, or lumbar tenderness  Skin:  Warm and dry.  No rash or lesions noted.  Neuro: Alert. Normal strength.  GCS: 15  Psych:  Normal mood and affect.  Extremely anxious appearing.  Lymph: No anterior or posterior cervical lymphadenopathy noted.    Emergency Department Course   ECG (20:24:07):  Rate 110 bpm. ID interval 114. QRS duration 90. QT/QTc 382/516. P-R-T axes 58 7 29. Sinus tachycardia. Cannot rule out anterior infarct, age  undetermined. Abnormal ECG. Nonspecific ST segment changes in I, aVL. Agree with computer interpretation. No significant change when compared to EKG dated 11/13/2017.  Interpreted at 2029 by Melvin Robert MD.     Laboratory:  CBC: AWNL (WBC 9.6, HGB 13.1, )  BMP: K 3.0 (L), CO 19 (L),  (H), Ca 8.1 (L) o/w WNL (Creatinine 0.80)  2022: Troponin: 0.017      Interventions:  2007: NS 1L IV Bolus   2004: Ativan 0.5 mg IV  2015: Zantac 150 mg PO    2314: Potassium chloride 40 mEq PO     Emergency Department Course:  Past medical records, nursing notes, and vitals reviewed.  1955: I performed an exam of the patient and obtained history, as documented above.     Above interventions provided.  IV inserted and blood drawn.  EKG was taken here in the ED, results as above.     2331: I rechecked the patient. Findings and plan explained to the Patient. Patient discharged home with instructions regarding supportive care, medications, and reasons to return. The importance of close follow-up was reviewed.      Impression & Plan      Medical Decision Making:  Fadia Brooks is a 47 year old female who presents for evaluation of an allergic reaction after eating a peanut butter sandwich. Patient was evaluated earlier this week for allergic reaction to lobster.  Patient's symptoms are most consistent with allergic phenomena. She did have two doses of epinephrine prior to arrival.  She also took six puffs of her albuterol inhaler and patient appears very tremulous and anxious on initial arrival to the emergency department. I do not appreciate  signs of angioedema, respiratory compromise, shock, serious systemic reaction, etc.  There are no signs of serious infection, cellulitis, vasculitis, or other skin manifestation of a serious underlying disease.  The patient has had all medications prior to arrival in the emergency department.  I did provide some IV fluids to assist with rehydration.  There is no rebound allergic  reaction or anaphylaxis while under my care here in the emergency department for over three hours.  Patient did describe some floaters and/or scotomas associated with the headache, but her bilateral optic disks appear to have normal vasculature and she did not describe vision loss with blackened vision but rather a reddened appearance to portions of her left visual field. Lower concern for symptoms suspicious for stroke or retinal detachment.  Patient still has normal vision.  She was currently wearing glasses while here in the emergency department.  We discussed close return precautions if she develops stroke symptoms such as complete loss of vision, black curtain in her visual field,  Dysarthria or dysphasia, or weakness/numbness/tingling in upper or lower extremities. Close followup with primary care physician.  Return if  wheezing, progressive shortness of breath, facial or throat/tongue swelling. Full anticipatory guidance given prior to discharge. Patient does have an appointment scheduled with an allergist was counseled to continue with this.  Literature given on allergic reactions prior to discharge.  All questions answered prior to discharge.  Discharged home.    Diagnosis:    ICD-10-CM   1. Allergic reaction, initial encounter T78.40XA       Disposition:  Discharged to home with plan as outlined.      I, Elio Amezquita, am serving as a scribe at 10:28 PM on 3/20/2018 to document services personally performed by Melvin Robert MD based on my observations and the provider's statements to me.    3/20/2018   [facility] EMERGENCY DEPARTMENT     Melvin Robert MD  03/21/18 0029       Melvin Robert MD  03/21/18 0030

## 2022-09-07 ENCOUNTER — HOSPITAL ENCOUNTER (EMERGENCY)
Facility: CLINIC | Age: 52
Discharge: HOME | End: 2022-09-08
Attending: EMERGENCY MEDICINE | Admitting: EMERGENCY MEDICINE
Payer: COMMERCIAL

## 2022-09-07 ENCOUNTER — APPOINTMENT (OUTPATIENT)
Dept: GENERAL RADIOLOGY | Facility: CLINIC | Age: 52
End: 2022-09-07
Attending: EMERGENCY MEDICINE
Payer: COMMERCIAL

## 2022-09-07 DIAGNOSIS — M10.9 ACUTE GOUTY ARTHRITIS: ICD-10-CM

## 2022-09-07 PROCEDURE — 73660 X-RAY EXAM OF TOE(S): CPT | Mod: RT

## 2022-09-07 PROCEDURE — 250N000013 HC RX MED GY IP 250 OP 250 PS 637: Performed by: EMERGENCY MEDICINE

## 2022-09-07 PROCEDURE — 99284 EMERGENCY DEPT VISIT MOD MDM: CPT

## 2022-09-07 RX ORDER — ACETAMINOPHEN 325 MG/10.15ML
1000 LIQUID ORAL ONCE
Status: COMPLETED | OUTPATIENT
Start: 2022-09-07 | End: 2022-09-07

## 2022-09-07 RX ORDER — PREDNISOLONE SODIUM PHOSPHATE 15 MG/1
45 TABLET, ORALLY DISINTEGRATING ORAL ONCE
Status: COMPLETED | OUTPATIENT
Start: 2022-09-08 | End: 2022-09-08

## 2022-09-07 RX ADMIN — ACETAMINOPHEN 1000 MG: 325 SOLUTION ORAL at 21:36

## 2022-09-07 ASSESSMENT — ENCOUNTER SYMPTOMS
FEVER: 0
ARTHRALGIAS: 1
JOINT SWELLING: 1

## 2022-09-08 VITALS
OXYGEN SATURATION: 94 % | TEMPERATURE: 97.5 F | HEART RATE: 79 BPM | SYSTOLIC BLOOD PRESSURE: 157 MMHG | DIASTOLIC BLOOD PRESSURE: 115 MMHG | RESPIRATION RATE: 16 BRPM

## 2022-09-08 LAB — URATE SERPL-MCNC: 5.7 MG/DL (ref 2.4–5.7)

## 2022-09-08 PROCEDURE — 250N000012 HC RX MED GY IP 250 OP 636 PS 637: Performed by: EMERGENCY MEDICINE

## 2022-09-08 PROCEDURE — 84550 ASSAY OF BLOOD/URIC ACID: CPT | Performed by: EMERGENCY MEDICINE

## 2022-09-08 PROCEDURE — 36415 COLL VENOUS BLD VENIPUNCTURE: CPT | Performed by: EMERGENCY MEDICINE

## 2022-09-08 RX ORDER — PREDNISOLONE SODIUM PHOSPHATE 15 MG/1
45 TABLET, ORALLY DISINTEGRATING ORAL DAILY
Qty: 15 TABLET | Refills: 0 | Status: SHIPPED | OUTPATIENT
Start: 2022-09-08 | End: 2022-09-13

## 2022-09-08 RX ORDER — OXYCODONE HYDROCHLORIDE 5 MG/1
5 TABLET ORAL EVERY 6 HOURS PRN
Qty: 10 TABLET | Refills: 0 | Status: SHIPPED | OUTPATIENT
Start: 2022-09-08 | End: 2022-09-11

## 2022-09-08 RX ADMIN — PREDNISOLONE SODIUM PHOSPHATE 45 MG: 15 TABLET, ORALLY DISINTEGRATING ORAL at 00:35

## 2022-09-08 NOTE — ED PROVIDER NOTES
History     Chief Complaint:  Toe Pain    HPI   Fadia Brooks is a 52 year old female with history of osteoarthritis in the foot who presents with toe pain. The patient reports that for the past week she has been having pain in her right big toe. She states that then last night the pain significantly increased and was painful with blanket touching toe. She then reports she was working all day today and then had increased swelling in toe, toe numbness and decreased range of motion. The patient denies any trauma or falls recently. She denies any fever.     Review of Systems   Constitutional: Negative for fever.   Musculoskeletal: Positive for arthralgias and joint swelling.   All other systems reviewed and are negative.    Allergies:  Gluten Meal  Penicillins  Shellfish-Derived Products    Medications:  Albuterol     Past Medical History:     Cervical radiculitis   Lumbosacral radiculitis   Asthma   Celiac disease   GERD   RLS   TIA   Achalasia of esophagus     Past Surgical History:    Appendectomy   Cholecystectomy   Correct malrotation of bowel   Tubal ligation   Vitrectomy     Social History:  The patient presents to the ED with her .   PCP: Brenda Mendoza     Physical Exam     Patient Vitals for the past 24 hrs:   BP Temp Temp src Pulse Resp SpO2   09/07/22 2133 167/98 97.5  F (36.4  C) Temporal 114 16 99 %     Physical Exam  Nursing note and vitals reviewed.  Constitutional: Cooperative.   Cardiovascular: Normal perfusion in right great toe.   Pulmonary/Chest: Effort normal.   Musculoskeletal: Pain with movement of right MTP joint, Mild swelling in MTP area, no warmth or erythema, No paronychia.   Neurological: Alert. Oriented x4  Skin: Skin is warm and dry. No rash noted.   Psychiatric: Normal mood and affect.     Emergency Department Course     Imaging:  XR Toe Right G/E 2 Views   Final Result   IMPRESSION: No acute fracture or dislocation. Two small rounded calcifications along the  interphalangeal joint of the great toe measuring up to 5 mm could represent intra-articular bodies or sequela of remote trauma.      Report per radiology    Laboratory:  Uric Acid: pending     Reviewed:  I reviewed nursing notes, vitals and past medical history    Assessments:  2346 I obtained history and examined the patient as noted above.     Interventions:  Medications   prednisoLONE (ORAPRED ODT) ODT tab 45 mg (has no administration in time range)   acetaminophen (TYLENOL) solution 1,000 mg (1,000 mg Oral Given 9/7/22 2136)     Disposition:  The patient was discharged to home.     Impression & Plan     Medical Decision Making:  Fadia Brooks is a 52 year old female who presents for evaluation of right great toe pain. There is swelling.  This is consistent based on history and workup to be acute gouty attack.  Based on their history, elected to start steroids for this acute gouty flare. NSAID contraindicated due to esophageal history.  XR imaging negative. I doubt at this point that this is a septic arthritis, fracture, pseudogout, cellulitis, or other worrisome etiology.  Supportive outpatient management indicated.  Should see primary in next 1-2 days for recheck. Gout information given form home.      Diagnosis:    ICD-10-CM    1. Acute gouty arthritis  M10.9        Discharge medication:  1. Roxicodone  2. Orapred ODT    Scribe Disclosure:  I, Bety Cesar, am serving as a scribe at 11:46 PM on 9/7/2022 to document services personally performed by Jr Romero MD based on my observations and the provider's statements to me.            Jr Romero MD  09/08/22 2247

## 2022-09-08 NOTE — ED TRIAGE NOTES
"Pt c/o right great toe pain for 1 week. Has been hialry taping. Pt \"extremely sensitive\" to touch. Ice not helping. Nurse care line told pt to come in right away.       "

## 2022-09-08 NOTE — ED PROVIDER NOTES
Patient called stating that she did not receive her prescriptions for her discharge medications.  I reviewed the chart from Dr. Romero overnight.  She was supposed to be discharged with oxycodone and with Orapred.  I reviewed and cannot see that any prescriptions were printed or sent electronically.  I provided prescriptions for these 2 medications consistent with Dr. Romero's intentions.     Valarie Gilmore MD  09/08/22 0861